# Patient Record
Sex: MALE | Race: WHITE | NOT HISPANIC OR LATINO | Employment: PART TIME | URBAN - NONMETROPOLITAN AREA
[De-identification: names, ages, dates, MRNs, and addresses within clinical notes are randomized per-mention and may not be internally consistent; named-entity substitution may affect disease eponyms.]

---

## 2023-12-21 ENCOUNTER — OFFICE VISIT (OUTPATIENT)
Dept: INTERNAL MEDICINE | Facility: CLINIC | Age: 58
End: 2023-12-21
Payer: MEDICARE

## 2023-12-21 VITALS
DIASTOLIC BLOOD PRESSURE: 82 MMHG | TEMPERATURE: 98.3 F | HEIGHT: 68 IN | OXYGEN SATURATION: 96 % | HEART RATE: 59 BPM | SYSTOLIC BLOOD PRESSURE: 128 MMHG

## 2023-12-21 DIAGNOSIS — E78.5 HYPERLIPIDEMIA, UNSPECIFIED HYPERLIPIDEMIA TYPE: ICD-10-CM

## 2023-12-21 DIAGNOSIS — J30.2 SEASONAL ALLERGIES: ICD-10-CM

## 2023-12-21 DIAGNOSIS — N40.1 BPH ASSOCIATED WITH NOCTURIA: ICD-10-CM

## 2023-12-21 DIAGNOSIS — Z12.11 SCREENING FOR COLON CANCER: ICD-10-CM

## 2023-12-21 DIAGNOSIS — I71.40 ABDOMINAL AORTIC ANEURYSM (AAA) WITHOUT RUPTURE, UNSPECIFIED PART: ICD-10-CM

## 2023-12-21 DIAGNOSIS — M62.838 MUSCLE SPASM: ICD-10-CM

## 2023-12-21 DIAGNOSIS — K21.9 GASTROESOPHAGEAL REFLUX DISEASE WITHOUT ESOPHAGITIS: ICD-10-CM

## 2023-12-21 DIAGNOSIS — I10 PRIMARY HYPERTENSION: ICD-10-CM

## 2023-12-21 DIAGNOSIS — Z76.89 ENCOUNTER TO ESTABLISH CARE: Primary | ICD-10-CM

## 2023-12-21 DIAGNOSIS — M54.2 NECK PAIN ON LEFT SIDE: ICD-10-CM

## 2023-12-21 DIAGNOSIS — R35.1 BPH ASSOCIATED WITH NOCTURIA: ICD-10-CM

## 2023-12-21 DIAGNOSIS — I65.22 STENOSIS OF LEFT CAROTID ARTERY: ICD-10-CM

## 2023-12-21 LAB
ALBUMIN SERPL-MCNC: 4.6 G/DL (ref 3.5–5.2)
ALBUMIN/GLOB SERPL: 1.8 G/DL
ALP SERPL-CCNC: 81 U/L (ref 39–117)
ALT SERPL-CCNC: 13 U/L (ref 1–41)
AST SERPL-CCNC: 15 U/L (ref 1–40)
BILIRUB SERPL-MCNC: 0.8 MG/DL (ref 0–1.2)
BUN SERPL-MCNC: 20 MG/DL (ref 8–23)
BUN/CREAT SERPL: 17.4 (ref 7–25)
CALCIUM SERPL-MCNC: 9.5 MG/DL (ref 8.6–10.5)
CHLORIDE SERPL-SCNC: 104 MMOL/L (ref 98–107)
CHOLEST SERPL-MCNC: 184 MG/DL (ref 0–200)
CO2 SERPL-SCNC: 20.6 MMOL/L (ref 22–29)
CREAT SERPL-MCNC: 1.15 MG/DL (ref 0.76–1.27)
EGFRCR SERPLBLD CKD-EPI 2021: 72.9 ML/MIN/1.73
ERYTHROCYTE [DISTWIDTH] IN BLOOD BY AUTOMATED COUNT: 12.4 % (ref 12.3–15.4)
FOLATE SERPL-MCNC: 12.1 NG/ML (ref 4.78–24.2)
GLOBULIN SER CALC-MCNC: 2.6 GM/DL
GLUCOSE SERPL-MCNC: 87 MG/DL (ref 65–99)
HCT VFR BLD AUTO: 51.6 % (ref 37.5–51)
HDLC SERPL-MCNC: 55 MG/DL (ref 40–60)
HGB BLD-MCNC: 17.4 G/DL (ref 13–17.7)
LDLC SERPL CALC-MCNC: 115 MG/DL (ref 0–100)
MAGNESIUM SERPL-MCNC: 2.3 MG/DL (ref 1.6–2.4)
MCH RBC QN AUTO: 30.9 PG (ref 26.6–33)
MCHC RBC AUTO-ENTMCNC: 33.7 G/DL (ref 31.5–35.7)
MCV RBC AUTO: 91.7 FL (ref 79–97)
PLATELET # BLD AUTO: 167 10*3/MM3 (ref 140–450)
POTASSIUM SERPL-SCNC: 4.8 MMOL/L (ref 3.5–5.2)
PROT SERPL-MCNC: 7.2 G/DL (ref 6–8.5)
PSA SERPL-MCNC: 3.01 NG/ML (ref 0–4)
RBC # BLD AUTO: 5.63 10*6/MM3 (ref 4.14–5.8)
SODIUM SERPL-SCNC: 137 MMOL/L (ref 136–145)
TRIGL SERPL-MCNC: 76 MG/DL (ref 0–150)
TSH SERPL DL<=0.005 MIU/L-ACNC: 1.41 UIU/ML (ref 0.27–4.2)
VIT B12 SERPL-MCNC: 426 PG/ML (ref 211–946)
VLDLC SERPL CALC-MCNC: 14 MG/DL (ref 5–40)
WBC # BLD AUTO: 4.02 10*3/MM3 (ref 3.4–10.8)

## 2023-12-21 RX ORDER — LEVOCETIRIZINE DIHYDROCHLORIDE 5 MG/1
TABLET, FILM COATED ORAL
COMMUNITY

## 2023-12-21 RX ORDER — DIPHENOXYLATE HYDROCHLORIDE AND ATROPINE SULFATE 2.5; .025 MG/1; MG/1
TABLET ORAL DAILY
COMMUNITY

## 2023-12-21 RX ORDER — ROSUVASTATIN CALCIUM 20 MG/1
20 TABLET, COATED ORAL DAILY
COMMUNITY

## 2023-12-21 RX ORDER — BACLOFEN 10 MG/1
10 TABLET ORAL 3 TIMES DAILY
Qty: 90 TABLET | Refills: 0 | Status: SHIPPED | OUTPATIENT
Start: 2023-12-21

## 2023-12-21 RX ORDER — CYCLOBENZAPRINE HCL 5 MG
TABLET ORAL
COMMUNITY
Start: 2023-09-19 | End: 2023-12-21

## 2023-12-21 RX ORDER — LISINOPRIL 20 MG/1
20 TABLET ORAL SEE ADMIN INSTRUCTIONS
COMMUNITY

## 2023-12-21 NOTE — PROGRESS NOTES
Date: 2023    Name: Jason Hsu  : 1965    Chief Complaint:   Chief Complaint   Patient presents with    [facility] Care     With Priya today. Would like to discuss possible issue with prostate.     Neck Pain       HPI:  Jason Hsu is a 58 y.o. male presents to [facility] care. He wants to discuss issues with prostate and neck pain. He has hypertension, GERD, seasonal allergies, and hyperlipidemia. He is accompanied by wife.     HTN stable on lisinopril 30 mg daily. He had right carotid artery surgery in  which was 99% blocked and has 40-50% blockage on left. Follows Vascular surgeon in [place] q 6 months. Also has AAA which they are monitoring. He takes a baby aspirin every day along with statin. He denies any angina, dyspnea, SOA, headache, or dizziness.    He reports urinary urgency, nocturia, and polyuria. During the day, he urinates every 5 minutes. His stream is weak. He feels like he is emptying his bladder. He was experiencing these symptoms the last time his PSA was checked in  which was normal.     He has taken Nexium daily for the last 4 years for GERD/heartburn. He reports severe heartburn. He has tried natural remedies and other otc medications with no relief. Tried tapering off the medication but could not stand the heartburn.     He takes Xyzal for his seasonal allergies which are stable. Has hx vertigo occasionally. Does have LOUISE.     He has been having left sided neck pain for the last 3 to 4 weeks. The pain has not alleviated. He has Flexeril and Robaxin at home. He has fractured his neck twice. His neck is constantly stiff. He has a good pillow and a neck massager.    He is up to date with his vision and dental examinations. He is fasting today.  He does not smoke and does not have a history of smoking. Does not drink alcohol or use drugs. PSA utd. He had a colon screening 10 years ago, and it was normal.     History:    Past Medical History:   Diagnosis Date    Arthritis  "2016    Erectile dysfunction 2022    Frozen shoulder     bilateral    Hypertension 2018       Past Surgical History:   Procedure Laterality Date    COLONOSCOPY  2018?    KNEE SURGERY      othroscopic    SHOULDER SURGERY      SPINE SURGERY  7/2017       Family History   Problem Relation Age of Onset    Diabetes Mother     Kidney disease Mother     Cancer Sister        Social History     Socioeconomic History    Marital status:    Tobacco Use    Smoking status: Never    Smokeless tobacco: Never   Vaping Use    Vaping Use: Never used   Substance and Sexual Activity    Alcohol use: Not Currently    Drug use: Never    Sexual activity: Not Currently     Partners: Female     Birth control/protection: None       Not on File      Current Outpatient Medications:     aspirin 81 MG oral suspension, , Disp: , Rfl:     esomeprazole (nexIUM) 20 MG capsule, , Disp: , Rfl:     levocetirizine (XYZAL) 5 MG tablet, , Disp: , Rfl:     lisinopril (PRINIVIL,ZESTRIL) 20 MG tablet, Take 1 tablet by mouth See Admin Instructions. Take 1 tab in AM and 1/2 tab in PM, Disp: , Rfl:     Magnesium 125 MG capsule, , Disp: , Rfl:     multivitamin (MULTI VITAMIN PO), Take  by mouth Daily., Disp: , Rfl:     rosuvastatin (CRESTOR) 20 MG tablet, Take 1 tablet by mouth Daily., Disp: , Rfl:     baclofen (LIORESAL) 10 MG tablet, Take 1 tablet by mouth 3 (Three) Times a Day., Disp: 90 tablet, Rfl: 0    ROS:  Documented verbalized content in HPI.    VS:  Vitals:    12/21/23 0919   BP: 128/82   Pulse: 59   Temp: 98.3 °F (36.8 °C)   SpO2: 96%   Height: 172.7 cm (68\")   PainSc: 0-No pain     There is no height or weight on file to calculate BMI.    PE:  Physical Exam  Vitals reviewed.   Constitutional:       General: He is not in acute distress.     Appearance: Normal appearance. He is well-developed.   HENT:      Head: Normocephalic and atraumatic.      Right Ear: Ear canal and external ear normal.      Left Ear: Ear canal and external ear normal.      " Ears:      Comments: Bilateral middle ear effusion noted.     Nose: Nose normal.      Mouth/Throat:      Mouth: Mucous membranes are moist.      Pharynx: Oropharynx is clear.   Eyes:      Conjunctiva/sclera: Conjunctivae normal.      Pupils: Pupils are equal, round, and reactive to light.   Neck:      Thyroid: No thyromegaly or thyroid tenderness.      Vascular: No carotid bruit or JVD.      Trachea: Trachea and phonation normal.     Cardiovascular:      Rate and Rhythm: Normal rate and regular rhythm.      Heart sounds: Normal heart sounds. No murmur heard.     No friction rub. No gallop.   Pulmonary:      Effort: Pulmonary effort is normal.      Breath sounds: Normal breath sounds. No wheezing or rhonchi.   Abdominal:      General: Bowel sounds are normal. There is no distension.      Palpations: Abdomen is soft.      Tenderness: There is no abdominal tenderness.   Musculoskeletal:         General: Normal range of motion.      Cervical back: Neck supple. Pain with movement and muscular tenderness present. Decreased range of motion.   Lymphadenopathy:      Cervical: No cervical adenopathy.   Skin:     General: Skin is warm and dry.   Neurological:      Mental Status: He is alert and oriented to person, place, and time.      Cranial Nerves: No cranial nerve deficit.   Psychiatric:         Mood and Affect: Mood and affect normal.         Behavior: Behavior normal.         Thought Content: Thought content normal.         Judgment: Judgment normal.         Assessment/Plan:       Diagnoses and all orders for this visit:    1. Encounter to establish care (Primary)    2. Primary hypertension  -     CBC (No Diff)  -     Comprehensive Metabolic Panel  -     Lipid Panel  -     TSH Rfx On Abnormal To Free T4  -     Vitamin B12  -     Folate  -     Magnesium    3. Hyperlipidemia, unspecified hyperlipidemia type  -     Lipid Panel    4. BPH associated with nocturia  -     PSA Diagnostic    5. Gastroesophageal reflux disease  without esophagitis    6. Seasonal allergies    7. Neck pain on left side    8. Muscle spasm  -     baclofen (LIORESAL) 10 MG tablet; Take 1 tablet by mouth 3 (Three) Times a Day.  Dispense: 90 tablet; Refill: 0  -     CBC (No Diff)  -     Comprehensive Metabolic Panel  -     Vitamin B12  -     Folate  -     Magnesium    9. Screening for colon cancer  -     Ambulatory Referral For Screening Colonoscopy    10. Stenosis of left carotid artery    11. Abdominal aortic aneurysm (AAA) without rupture, unspecified part    1. Hypertension  - Stable. Continue medication as prescribed. DASH diet, exercise 150 minutes a week, home blood pressure monitoring, goal less than 130/80 mmHg.    2. Hyperlipidemia  - Stable with previous lipid panel from 06/2023. Update lipid panel fasting. Low cholesterol diet, exercise regimen.    3. BPH associated with nocturia  - Ordered PSA diagnostic. If normal, continues to be symptomatic, consider treatment with Flomax.    4. GERD  - Stable on Nexium. Reflux measures discussed.    5. Seasonal allergies  - Stable on Xyzal. Initiate fluticasone nasal spray 2 sprays in each nostril daily for middle ear effusion.    6. Neck pain on left side, muscle spasm  - Recommend heat. Demonstrated stretches for patient to do twice a day. Baclofen prescribed to take three times a day as needed. NSAIDs as needed. Consider physical therapy if symptoms persist.    7. Colon screening  - Referral for screening colonoscopy.     8. HCM  - Patient is to obtain labs today fasting. He is up to date with vision and dental. Colon screening ordered. Medicare wellness visit will be scheduled for his next visit. Declines vaccinations today.    9. Carotid artery stenosis, AAA  - Continue follow up Vascular surgery q 6 months as directed for monitoring     Return in about 6 months (around 6/21/2024) for Medicare Wellness.      CAMILO Cuenca    Transcribed from ambient dictation for CAMILO Wetzel by Marylin  Jonh.  12/21/23   13:30 EST    Patient or patient representative verbalized consent to the visit recording.  I have personally performed the services described in this document as transcribed by the above individual, and it is both accurate and complete.

## 2023-12-22 DIAGNOSIS — N40.1 BPH ASSOCIATED WITH NOCTURIA: Primary | ICD-10-CM

## 2023-12-22 DIAGNOSIS — R35.1 BPH ASSOCIATED WITH NOCTURIA: Primary | ICD-10-CM

## 2023-12-22 RX ORDER — TAMSULOSIN HYDROCHLORIDE 0.4 MG/1
1 CAPSULE ORAL DAILY
Qty: 30 CAPSULE | Refills: 2 | Status: SHIPPED | OUTPATIENT
Start: 2023-12-22

## 2023-12-22 NOTE — PROGRESS NOTES
LDL or bad cholesterol 115, work on low cholesterol diet, exercise regimen, continue cholesterol meds nightly   Other labs normal, kidney function is good no concerns there   PSA normal. Will send you in a medication to try and see if helps with your urinary symptoms

## 2024-01-12 ENCOUNTER — OFFICE VISIT (OUTPATIENT)
Dept: INTERNAL MEDICINE | Facility: CLINIC | Age: 59
End: 2024-01-12
Payer: MEDICARE

## 2024-01-12 VITALS
SYSTOLIC BLOOD PRESSURE: 122 MMHG | RESPIRATION RATE: 16 BRPM | OXYGEN SATURATION: 99 % | TEMPERATURE: 97.8 F | HEART RATE: 68 BPM | BODY MASS INDEX: 26.83 KG/M2 | HEIGHT: 68 IN | WEIGHT: 177 LBS | DIASTOLIC BLOOD PRESSURE: 78 MMHG

## 2024-01-12 DIAGNOSIS — I65.22 STENOSIS OF LEFT CAROTID ARTERY: ICD-10-CM

## 2024-01-12 DIAGNOSIS — M54.12 ACUTE CERVICAL RADICULOPATHY: ICD-10-CM

## 2024-01-12 DIAGNOSIS — M54.2 NECK PAIN ON LEFT SIDE: Primary | ICD-10-CM

## 2024-01-12 PROCEDURE — 99213 OFFICE O/P EST LOW 20 MIN: CPT | Performed by: NURSE PRACTITIONER

## 2024-01-12 PROCEDURE — 1159F MED LIST DOCD IN RCRD: CPT | Performed by: NURSE PRACTITIONER

## 2024-01-12 PROCEDURE — 1160F RVW MEDS BY RX/DR IN RCRD: CPT | Performed by: NURSE PRACTITIONER

## 2024-01-12 NOTE — PROGRESS NOTES
Answers submitted by the patient for this visit:  Primary Reason for Visit (Submitted on 2024)  What is the primary reason for your visit?: Other  Other (Submitted on 2024)  Please describe your symptoms.: Sharp ache at top of left shoulder shooting up into my neck.  Have you had these symptoms before?: No  How long have you been having these symptoms?: Greater than 2 weeks  Please list any medications you are currently taking for this condition.: Budafed - does nothing  Please describe any probable cause for these symptoms. : None    Office Visit      Patient Name: Jason Hsu  : 1965   MRN: [id number]   Care Team: Patient Care Team:  Priya Durand APRN as PCP - General (Family Medicine)    Chief Complaint  Shoulder Pain (Left side near neck for about 6-7 weeks getting worse) and Headache (Off and on)    Subjective     Subjective      Jason Hsu presents to [facility] for left-sided neck pain.  Symptoms have been present for at least 7 to 8 weeks.  Saw PCP last month and discussed with her, recommended baclofen and stretching neither of which have helped at all.  He is also having intermittent headaches.  The pain starts at the left base of the neck and radiates up into his head.  He has had rotator cuff repair on this shoulder.  He also suffers from vascular disease specifically carotid artery stenosis, he has known carotid disease of the artery on the left side.  He follows with vascular surgery in [place].  His most recent vascular testing was 8 or 9 months ago per his report and these results are not available for review at this time.  He has not called to discuss this with his vascular provider.  He does not feel like this is a muscular issue as he has tried massage without any improvement.  He is a  and is constantly looking down and has had multiple injuries in the past to the neck.  He has not had no cervical spine imaging recently.  Symptoms are  "worse when turning the head to the left and with flexion.  The symptoms are not reproduced with movement of the shoulder.  He cannot take any NSAIDs.  He is trying Tylenol and diclofenac gel with little relief.  Rates his pain a 4 out of 10 today.      Objective     Objective   Vital Signs:   /78   Pulse 68   Temp 97.8 °F (36.6 °C)   Resp 16   Ht 172.7 cm (67.99\")   Wt 80.3 kg (177 lb)   SpO2 99%   BMI 26.92 kg/m²     Physical Exam  Vitals and nursing note reviewed.   Constitutional:       General: He is not in acute distress.     Appearance: Normal appearance. He is not toxic-appearing.   Eyes:      Pupils: Pupils are equal, round, and reactive to light.   Neck:      Vascular: No carotid bruit (No appreciable bruit).   Cardiovascular:      Rate and Rhythm: Normal rate and regular rhythm.      Heart sounds: Normal heart sounds. No murmur heard.  Pulmonary:      Effort: Pulmonary effort is normal. No respiratory distress.      Breath sounds: Normal breath sounds. No wheezing.   Musculoskeletal:      Right shoulder: Normal.      Left shoulder: Normal.      Cervical back: Neck supple. No signs of trauma, rigidity or tenderness. Pain with movement (To the left with stiffness) present. No muscular tenderness. Decreased range of motion.      Comments: Scarring noted of left shoulder from rotator cuff surgery.   Skin:     General: Skin is warm and dry.      Findings: No rash.   Neurological:      General: No focal deficit present.      Mental Status: He is alert.   Psychiatric:         Mood and Affect: Mood normal.         Behavior: Behavior normal.          Assessment / Plan      Assessment & Plan   Problem List Items Addressed This Visit    None  Visit Diagnoses       Neck pain on left side    -  Primary    Relevant Orders    MRI Cervical Spine Without Contrast    Stenosis of left carotid artery        Acute cervical radiculopathy        Relevant Orders    MRI Cervical Spine Without Contrast    I suspect " this is of musculoskeletal origin.  I recommend MRI to further assess for disc disease.  I recommended PT but they politely declined as they have been doing with a can at home with no improvement.  I have a low suspicion this is a vascular cause but he will call and talk with his vascular surgeon, may need updated CT angiogram.  Recommend stretching, heat, extra strength Tylenol, diclofenac gel.  Further recommendations once MRI results have been reviewed.              Follow Up   Return if symptoms worsen or fail to improve.  Patient was given instructions and counseling regarding his condition or for health maintenance advice. Please see specific information pulled into the AVS if appropriate.     CAMILO Fregoso  [facility]

## 2024-03-06 DIAGNOSIS — N40.1 BPH ASSOCIATED WITH NOCTURIA: ICD-10-CM

## 2024-03-06 DIAGNOSIS — R35.1 BPH ASSOCIATED WITH NOCTURIA: ICD-10-CM

## 2024-03-06 RX ORDER — TAMSULOSIN HYDROCHLORIDE 0.4 MG/1
1 CAPSULE ORAL DAILY
Qty: 30 CAPSULE | Refills: 2 | Status: SHIPPED | OUTPATIENT
Start: 2024-03-06

## 2024-04-02 ENCOUNTER — HOSPITAL ENCOUNTER (OUTPATIENT)
Dept: MRI IMAGING | Facility: HOSPITAL | Age: 59
Discharge: HOME | End: 2024-04-02
Admitting: NURSE PRACTITIONER
Payer: MEDICARE

## 2024-04-02 DIAGNOSIS — M54.2 NECK PAIN ON LEFT SIDE: ICD-10-CM

## 2024-04-02 DIAGNOSIS — M54.12 ACUTE CERVICAL RADICULOPATHY: Primary | ICD-10-CM

## 2024-04-02 DIAGNOSIS — M50.30 DISC DISEASE, DEGENERATIVE, CERVICAL: ICD-10-CM

## 2024-04-02 DIAGNOSIS — M54.12 ACUTE CERVICAL RADICULOPATHY: ICD-10-CM

## 2024-04-02 PROCEDURE — 72141 MRI NECK SPINE W/O DYE: CPT

## 2024-05-14 ENCOUNTER — OFFICE VISIT (OUTPATIENT)
Dept: INTERNAL MEDICINE | Facility: CLINIC | Age: 59
End: 2024-05-14
Payer: MEDICARE

## 2024-05-14 VITALS
TEMPERATURE: 97.8 F | DIASTOLIC BLOOD PRESSURE: 72 MMHG | HEART RATE: 72 BPM | WEIGHT: 176 LBS | OXYGEN SATURATION: 99 % | BODY MASS INDEX: 26.67 KG/M2 | SYSTOLIC BLOOD PRESSURE: 122 MMHG | HEIGHT: 68 IN

## 2024-05-14 DIAGNOSIS — M79.671 PAIN IN BOTH FEET: ICD-10-CM

## 2024-05-14 DIAGNOSIS — R79.9 ABNORMAL FINDING OF BLOOD CHEMISTRY, UNSPECIFIED: ICD-10-CM

## 2024-05-14 DIAGNOSIS — R53.82 CHRONIC FATIGUE: ICD-10-CM

## 2024-05-14 DIAGNOSIS — M79.89 INTERMITTENT PAIN AND SWELLING OF HAND: Primary | ICD-10-CM

## 2024-05-14 DIAGNOSIS — M79.672 PAIN IN BOTH FEET: ICD-10-CM

## 2024-05-14 DIAGNOSIS — M79.643 INTERMITTENT PAIN AND SWELLING OF HAND: Primary | ICD-10-CM

## 2024-05-14 PROCEDURE — 1125F AMNT PAIN NOTED PAIN PRSNT: CPT | Performed by: NURSE PRACTITIONER

## 2024-05-14 PROCEDURE — 1159F MED LIST DOCD IN RCRD: CPT | Performed by: NURSE PRACTITIONER

## 2024-05-14 PROCEDURE — 1160F RVW MEDS BY RX/DR IN RCRD: CPT | Performed by: NURSE PRACTITIONER

## 2024-05-14 PROCEDURE — 99214 OFFICE O/P EST MOD 30 MIN: CPT | Performed by: NURSE PRACTITIONER

## 2024-05-14 NOTE — PROGRESS NOTES
Office Visit      Date: 2024   Patient Name: Jason Hsu  : 1965   MRN: [id number]     Chief Complaint:    Chief Complaint   Patient presents with    Arthritis     Bilateral. Ache pain, heat In knuckles. X1 month it has gotten worse.        History of Present Illness: Jason Hsu is a 58 y.o. male presenting for bilateral hand and foot pain. Wife is present.   The patient reports experiencing soreness, swelling, and tenderness in his fingers, accompanied by redness and warmth intermittently. He also experiences pain in his knuckles and hands, to the extent that he is unable to open them. These symptoms have been present for a year and have progressively worsened. An x-ray of his hand was conducted in the past revealing arthritis. The patient also reports soreness in the balls of his feet, which he attributes to prolonged standing at work. He describes the sensation as walking on bricks and nails. He denies any other joint pain. He wears supportive shoes with insoles. Plans to go to soft shoe after this and get special insoles. The inflammation in in his hands subsided about 3 days ago after reducing his sugar intake.   Wife is concerned for iron deficiency, noting that the patient frequently feels fatigued.   The patient has bleeding under the right thumb, but he does not recall any trauma to the area. He denies any pain, erythema, drainage from nail bed.   His brother had gout. His grandmother had arthritis in hands.    Subjective      Review of Systems:   Pertinent ROS noted in HPI.     I have reviewed the patients family history, social history, past medical history, past surgical history and have updated it as appropriate.     Medications:     Current Outpatient Medications:     aspirin 81 MG oral suspension, , Disp: , Rfl:     esomeprazole (nexIUM) 20 MG capsule, , Disp: , Rfl:     levocetirizine (XYZAL) 5 MG tablet, , Disp: , Rfl:     lisinopril (PRINIVIL,ZESTRIL) 20 MG tablet, Take 1 tablet  by mouth See Admin Instructions. Take 1 tab in AM and 1/2 tab in PM, Disp: , Rfl:     Magnesium 125 MG capsule, , Disp: , Rfl:     multivitamin (MULTI VITAMIN PO), Take  by mouth Daily., Disp: , Rfl:     rosuvastatin (CRESTOR) 20 MG tablet, Take 1 tablet by mouth Daily., Disp: , Rfl:     tamsulosin (FLOMAX) 0.4 MG capsule 24 hr capsule, Take 1 capsule by mouth Daily., Disp: 30 capsule, Rfl: 2    baclofen (LIORESAL) 10 MG tablet, Take 1 tablet by mouth 3 (Three) Times a Day. (Patient not taking: Reported on 1/12/2024), Disp: 90 tablet, Rfl: 0    Allergies:   No Known Allergies    Objective     Physical Exam  Physical Exam  Constitutional:       General: He is not in acute distress.     Appearance: Normal appearance.   HENT:      Head: Normocephalic and atraumatic.      Right Ear: External ear normal.      Left Ear: External ear normal.      Nose: Nose normal.   Eyes:      General: No scleral icterus.     Extraocular Movements: Extraocular movements intact.      Pupils: Pupils are equal, round, and reactive to light.   Cardiovascular:      Rate and Rhythm: Normal rate and regular rhythm.      Heart sounds: Normal heart sounds.   Pulmonary:      Effort: Pulmonary effort is normal.      Breath sounds: Normal breath sounds.   Musculoskeletal:         General: Tenderness and deformity present. No swelling. Normal range of motion.      Right hand: Deformity and bony tenderness present.      Left hand: Deformity and bony tenderness present.      Cervical back: Normal range of motion.      Right lower leg: No edema.      Left lower leg: No edema.      Comments: DIP right middle finger tenderness  Some nodules in DIP joints   No erythema, warmth   Skin:     General: Skin is warm and dry.   Neurological:      General: No focal deficit present.      Mental Status: He is alert and oriented to person, place, and time.   Psychiatric:         Mood and Affect: Mood normal.         Behavior: Behavior normal.         Thought Content:  "Thought content normal.         Judgment: Judgment normal.         Vital Signs:   Vitals:    05/14/24 1353   BP: 122/72   Pulse: 72   Temp: 97.8 °F (36.6 °C)   SpO2: 99%   Weight: 79.8 kg (176 lb)   Height: 172.7 cm (68\")     Body mass index is 26.76 kg/m².    Assessment / Plan      Assessment/Plan:   Problem List Items Addressed This Visit    None  Visit Diagnoses       Intermittent pain and swelling of hand    -  Primary    Relevant Orders    RHEUMATOID FACTOR    Sedimentation rate, automated    C-reactive protein    GINA With / DsDNA, RNP, Sjogrens A / B, Smith    Testosterone (Free & Total), LC / MS    CBC No Differential    Iron and TIBC    Uric acid    Pain in both feet        Relevant Orders    RHEUMATOID FACTOR    Sedimentation rate, automated    C-reactive protein    GINA With / DsDNA, RNP, Sjogrens A / B, Smith    Testosterone (Free & Total), LC / MS    CBC No Differential    Iron and TIBC    Uric acid    Chronic fatigue        Relevant Orders    RHEUMATOID FACTOR    Sedimentation rate, automated    C-reactive protein    GINA With / DsDNA, RNP, Sjogrens A / B, Smith    Testosterone (Free & Total), LC / MS    CBC No Differential    Iron and TIBC    Uric acid            Labs to further evaluate for autoimmune / inflammatory arthritis   Discussed diet; limiting sugar helped  Eat healthy balanced diet  Avoid alcohol or high purine foods in case of gout  NSAIDs sparingly PRN, tylenol arthritis otc PRN  Fatigue can be multifactorial. Ensure adequate sleep 7-9 hours per night, exercise regularly, adequate hydration >64 fluid oz per day; per wife request will check for anemia as well add on testosterone. Testosterone needs to be completed early morning fasting as soon as wake up.     Follow Up:   Return for Medicare Wellness.      Priya PAGE  [facility]  "

## 2024-05-15 LAB
ANA SER QL: NEGATIVE
CRP SERPL-MCNC: <0.3 MG/DL (ref 0–0.5)
ERYTHROCYTE [DISTWIDTH] IN BLOOD BY AUTOMATED COUNT: 12.8 % (ref 12.3–15.4)
ERYTHROCYTE [SEDIMENTATION RATE] IN BLOOD BY WESTERGREN METHOD: 2 MM/HR (ref 0–20)
HCT VFR BLD AUTO: 47.7 % (ref 37.5–51)
HGB BLD-MCNC: 16.2 G/DL (ref 13–17.7)
IRON SATN MFR SERPL: 29 % (ref 20–50)
IRON SERPL-MCNC: 91 MCG/DL (ref 59–158)
MCH RBC QN AUTO: 30.8 PG (ref 26.6–33)
MCHC RBC AUTO-ENTMCNC: 34 G/DL (ref 31.5–35.7)
MCV RBC AUTO: 90.7 FL (ref 79–97)
PLATELET # BLD AUTO: 159 10*3/MM3 (ref 140–450)
RBC # BLD AUTO: 5.26 10*6/MM3 (ref 4.14–5.8)
RHEUMATOID FACT SERPL-ACNC: <10 IU/ML
TIBC SERPL-MCNC: 316 MCG/DL
UIBC SERPL-MCNC: 225 MCG/DL (ref 112–346)
WBC # BLD AUTO: 4.85 10*3/MM3 (ref 3.4–10.8)

## 2024-05-17 NOTE — PROGRESS NOTES
Labs normal so far no signs of inflammation or autoimmune arthritis.   Still waiting on uric acid level to see if gout is a possibility

## 2024-05-18 DIAGNOSIS — R35.1 BPH ASSOCIATED WITH NOCTURIA: ICD-10-CM

## 2024-05-18 DIAGNOSIS — N40.1 BPH ASSOCIATED WITH NOCTURIA: ICD-10-CM

## 2024-05-18 LAB
URATE SERPL-MCNC: 4.5 MG/DL (ref 3.4–7)
WRITTEN AUTHORIZATION: NORMAL

## 2024-05-18 RX ORDER — ROSUVASTATIN CALCIUM 20 MG/1
20 TABLET, COATED ORAL DAILY
Qty: 90 TABLET | Refills: 1 | Status: SHIPPED | OUTPATIENT
Start: 2024-05-18

## 2024-05-18 RX ORDER — LISINOPRIL 20 MG/1
20 TABLET ORAL SEE ADMIN INSTRUCTIONS
Qty: 90 TABLET | Refills: 1 | Status: SHIPPED | OUTPATIENT
Start: 2024-05-18

## 2024-05-18 RX ORDER — TAMSULOSIN HYDROCHLORIDE 0.4 MG/1
1 CAPSULE ORAL DAILY
Qty: 30 CAPSULE | Refills: 2 | Status: SHIPPED | OUTPATIENT
Start: 2024-05-18

## 2024-05-20 NOTE — PROGRESS NOTES
Uric acid levels are normal, no signs of gout.  May be due to osteoarthritis. can send you to a rheumatologist who can do a further work up; they evaluate and treat different forms of arthritis.  You can take NSAIDs as needed such as over-the-counter ibuprofen, naproxen.  Just know that daily use of these medications over time especially high doses can cause gastric ulcers, bleeding, and decreased kidney function.  They are ok to take sparingly as needed on days that you have a really bad flare.  You can also try Tylenol arthritis, voltaren gel is an over-the-counter anti-inflammatory arthritis gel and capsaicin is also an over-the-counter topical treatment for arthritis

## 2024-07-17 ENCOUNTER — PATIENT MESSAGE (OUTPATIENT)
Dept: INTERNAL MEDICINE | Facility: CLINIC | Age: 59
End: 2024-07-17
Payer: MEDICARE

## 2024-07-17 NOTE — TELEPHONE ENCOUNTER
From: Jason Hsu  To: Priya Durand  Sent: 7/17/2024 9:39 AM EDT  Subject: Podiatrist     Good morning Priya,  I think I need to see a podiatrist as I am having increased pain at the back of and just above my left heel. Hurts worse after sitting and when I first get up and move around. My wife saw someone in [place] (Linda Zuñiga), but whoever you recommend is fine. Let me know if there are any issues, thanks.  Jason

## 2024-07-18 DIAGNOSIS — M79.671 PAIN IN BOTH FEET: Primary | ICD-10-CM

## 2024-07-18 DIAGNOSIS — M79.672 PAIN IN BOTH FEET: Primary | ICD-10-CM

## 2024-08-19 ENCOUNTER — OFFICE VISIT (OUTPATIENT)
Dept: INTERNAL MEDICINE | Facility: CLINIC | Age: 59
End: 2024-08-19
Payer: MEDICARE

## 2024-08-19 VITALS
TEMPERATURE: 98.6 F | BODY MASS INDEX: 27.43 KG/M2 | OXYGEN SATURATION: 96 % | HEIGHT: 68 IN | WEIGHT: 181 LBS | SYSTOLIC BLOOD PRESSURE: 118 MMHG | DIASTOLIC BLOOD PRESSURE: 64 MMHG | HEART RATE: 68 BPM

## 2024-08-19 DIAGNOSIS — R10.13 EPIGASTRIC PAIN: ICD-10-CM

## 2024-08-19 DIAGNOSIS — Z12.11 SCREENING FOR COLON CANCER: ICD-10-CM

## 2024-08-19 DIAGNOSIS — R19.5 ABNORMAL STOOL COLOR: Primary | ICD-10-CM

## 2024-08-19 PROCEDURE — 99214 OFFICE O/P EST MOD 30 MIN: CPT | Performed by: NURSE PRACTITIONER

## 2024-08-19 PROCEDURE — 1125F AMNT PAIN NOTED PAIN PRSNT: CPT | Performed by: NURSE PRACTITIONER

## 2024-08-19 NOTE — PROGRESS NOTES
Office Visit      Date: 2024   Patient Name: Jason Hsu  : 1965   MRN: [id number]     Chief Complaint:    Chief Complaint   Patient presents with    Stool Color Change     Yellowish X 1 week       History of Present Illness: Jason Hsu is a 59 y.o. male presents with wife for stool color change.  About a week ago stool was pale/white in color.  This went on for a few days, then it transitioned to a yellow color.  Now it is brown.  He has not had any diarrhea or constipation.  Has GERD and takes Nexium.  No melena, hematochezia, hematemesis.  He occasionally complains of abdominal pain in the epigastric region but is not a daily occurrence.  No bloating, fever, myalgia, chills.  Overdue for colon cancer screening.  Still has gallbladder.  Does not drink alcohol on a regular basis.    Subjective      Review of Systems:   Pertinent ROS noted in HPI.     I have reviewed the patients family history, social history, past medical history, past surgical history and have updated it as appropriate.     Medications:     Current Outpatient Medications:     aspirin 81 MG oral suspension, , Disp: , Rfl:     baclofen (LIORESAL) 10 MG tablet, Take 1 tablet by mouth 3 (Three) Times a Day. (Patient not taking: Reported on 2024), Disp: 90 tablet, Rfl: 0    esomeprazole (nexIUM) 20 MG capsule, , Disp: , Rfl:     levocetirizine (XYZAL) 5 MG tablet, , Disp: , Rfl:     lisinopril (PRINIVIL,ZESTRIL) 20 MG tablet, Take 1 tablet by mouth See Admin Instructions. Take 1 tab in AM and 1/2 tab in PM, Disp: 90 tablet, Rfl: 1    Magnesium 125 MG capsule, , Disp: , Rfl:     multivitamin (MULTI VITAMIN PO), Take  by mouth Daily., Disp: , Rfl:     rosuvastatin (CRESTOR) 20 MG tablet, Take 1 tablet by mouth Daily., Disp: 90 tablet, Rfl: 1    tamsulosin (FLOMAX) 0.4 MG capsule 24 hr capsule, Take 1 capsule by mouth Daily., Disp: 30 capsule, Rfl: 2    Allergies:   No Known Allergies    Objective     Physical Exam  Physical  "Exam  Constitutional:       General: He is not in acute distress.     Appearance: Normal appearance.   HENT:      Head: Normocephalic and atraumatic.   Eyes:      General: No scleral icterus.     Extraocular Movements: Extraocular movements intact.      Pupils: Pupils are equal, round, and reactive to light.   Cardiovascular:      Rate and Rhythm: Normal rate and regular rhythm.   Pulmonary:      Effort: Pulmonary effort is normal.      Breath sounds: Normal breath sounds.   Abdominal:      General: Abdomen is flat. Bowel sounds are normal.      Palpations: Abdomen is soft. There is no hepatomegaly, splenomegaly or mass.      Tenderness: There is no abdominal tenderness.   Musculoskeletal:         General: Normal range of motion.      Cervical back: Normal range of motion.   Skin:     General: Skin is warm and dry.      Coloration: Skin is not jaundiced.   Neurological:      General: No focal deficit present.      Mental Status: He is alert and oriented to person, place, and time. Mental status is at baseline.   Psychiatric:         Mood and Affect: Mood normal.         Behavior: Behavior normal.         Thought Content: Thought content normal.         Judgment: Judgment normal.         Vital Signs:   Vitals:    08/19/24 1310   BP: 118/64   Pulse: 68   Temp: 98.6 °F (37 °C)   SpO2: 96%   Weight: 82.1 kg (181 lb)   Height: 172.7 cm (68\")     Body mass index is 27.52 kg/m².       Assessment / Plan      Assessment/Plan:   Problem List Items Addressed This Visit    None  Visit Diagnoses       Abnormal stool color    -  Primary    Relevant Orders    Comprehensive Metabolic Panel    CBC No Differential    Gastrointestinal Panel, PCR - Stool, Per Rectum    Pancreatic Elastase, Fecal - Stool, Per Rectum    Ambulatory Referral For Screening Colonoscopy    Hepatitis panel, acute    Lipase    Epigastric pain        Relevant Orders    Comprehensive Metabolic Panel    CBC No Differential    Gastrointestinal Panel, PCR - Stool, " Per Rectum    Pancreatic Elastase, Fecal - Stool, Per Rectum    Ambulatory Referral For Screening Colonoscopy    Hepatitis panel, acute    Lipase    Screening for colon cancer        Relevant Orders    Ambulatory Referral For Screening Colonoscopy            Labs, stool tests to be completed today; discussed pale or yellow stool color changes can be r/t to gallbladder, liver, pancreas   Patient does report it has resolved and now more brown in color   After lab review, consider abdominal imaging with ultrasound   Referral general surgeon for consultation for colonoscopy; last colonoscopy was 11 years ago and normal     Follow Up:   RANDALL PAGE  [facility]  [facility]

## 2024-08-20 LAB
ALBUMIN SERPL-MCNC: 4.5 G/DL (ref 3.5–5.2)
ALBUMIN/GLOB SERPL: 1.9 G/DL
ALP SERPL-CCNC: 82 U/L (ref 39–117)
ALT SERPL-CCNC: 14 U/L (ref 1–41)
AST SERPL-CCNC: 14 U/L (ref 1–40)
BILIRUB SERPL-MCNC: 0.6 MG/DL (ref 0–1.2)
BUN SERPL-MCNC: 20 MG/DL (ref 8–23)
BUN/CREAT SERPL: 20.2 (ref 7–25)
CALCIUM SERPL-MCNC: 9.1 MG/DL (ref 8.6–10.5)
CHLORIDE SERPL-SCNC: 104 MMOL/L (ref 98–107)
CO2 SERPL-SCNC: 25.6 MMOL/L (ref 22–29)
CREAT SERPL-MCNC: 0.99 MG/DL (ref 0.76–1.27)
EGFRCR SERPLBLD CKD-EPI 2021: 86.7 ML/MIN/1.73
ERYTHROCYTE [DISTWIDTH] IN BLOOD BY AUTOMATED COUNT: 13.1 % (ref 12.3–15.4)
GLOBULIN SER CALC-MCNC: 2.4 GM/DL
GLUCOSE SERPL-MCNC: 91 MG/DL (ref 65–99)
HCT VFR BLD AUTO: 49.1 % (ref 37.5–51)
HGB BLD-MCNC: 16.5 G/DL (ref 13–17.7)
MCH RBC QN AUTO: 30.8 PG (ref 26.6–33)
MCHC RBC AUTO-ENTMCNC: 33.6 G/DL (ref 31.5–35.7)
MCV RBC AUTO: 91.6 FL (ref 79–97)
PLATELET # BLD AUTO: 154 10*3/MM3 (ref 140–450)
POTASSIUM SERPL-SCNC: 4.4 MMOL/L (ref 3.5–5.2)
PROT SERPL-MCNC: 6.9 G/DL (ref 6–8.5)
RBC # BLD AUTO: 5.36 10*6/MM3 (ref 4.14–5.8)
SODIUM SERPL-SCNC: 138 MMOL/L (ref 136–145)
WBC # BLD AUTO: 5.48 10*3/MM3 (ref 3.4–10.8)

## 2024-08-20 NOTE — PROGRESS NOTES
Patient: Jason Hsu    YOB: 1965    Date: 08/26/2024    Primary Care Provider: Priya Durand APRN    Chief Complaint   Patient presents with    Colonoscopy       SUBJECTIVE:    History of present illness: Patient here with complaint of change in stool color the last 5 days.  They were gray in color but now back to normal.  He was not having any abdominal complaints.  No black or bloody bowel movements.  It has been well over 10 years since his last colonoscopy.  Patient also with GERD which she has had since he was 23 years old.  Nexium controls his symptoms but occasionally has breakthrough symptoms.    The following portions of the patient's history were reviewed and updated as appropriate: allergies, current medications, past family history, past medical history, past social history, past surgical history and problem list.      Review of Systems   Constitutional:  Negative for chills, fever and unexpected weight change.   HENT:  Negative for trouble swallowing and voice change.    Eyes:  Negative for visual disturbance.   Respiratory:  Negative for apnea, cough, chest tightness, shortness of breath and wheezing.    Cardiovascular:  Negative for chest pain, palpitations and leg swelling.   Gastrointestinal:  Positive for abdominal pain. Negative for abdominal distention, anal bleeding, blood in stool, constipation, diarrhea, nausea, rectal pain and vomiting.   Endocrine: Negative for cold intolerance and heat intolerance.   Genitourinary:  Negative for difficulty urinating, dysuria, flank pain, scrotal swelling and testicular pain.   Musculoskeletal:  Negative for back pain, gait problem and joint swelling.   Skin:  Negative for color change, rash and wound.   Neurological:  Negative for dizziness, syncope, speech difficulty, weakness, numbness and headaches.   Hematological:  Negative for adenopathy. Does not bruise/bleed easily.   Psychiatric/Behavioral:  Negative for confusion. The patient is  "not nervous/anxious.          Allergies:  No Known Allergies    Medications:    Current Outpatient Medications:     aspirin 81 MG oral suspension, , Disp: , Rfl:     esomeprazole (nexIUM) 20 MG capsule, , Disp: , Rfl:     levocetirizine (XYZAL) 5 MG tablet, , Disp: , Rfl:     lisinopril (PRINIVIL,ZESTRIL) 20 MG tablet, Take 1 tablet by mouth See Admin Instructions. Take 1 tab in AM and 1/2 tab in PM, Disp: 90 tablet, Rfl: 1    Magnesium 125 MG capsule, , Disp: , Rfl:     multivitamin (MULTI VITAMIN PO), Take  by mouth Daily., Disp: , Rfl:     rosuvastatin (CRESTOR) 20 MG tablet, Take 1 tablet by mouth Daily., Disp: 90 tablet, Rfl: 1    tamsulosin (FLOMAX) 0.4 MG capsule 24 hr capsule, Take 1 capsule by mouth Daily., Disp: 30 capsule, Rfl: 2    History:  Past Medical History:   Diagnosis Date    Arthritis 2016    Benign prostatic hyperplasia 01/22    Erectile dysfunction 2022    Frozen shoulder     bilateral    Hypertension 2018       Past Surgical History:   Procedure Laterality Date    CAROTID STENT Right 2020    COLONOSCOPY  2018?    KNEE SURGERY      othroscopic    SHOULDER SURGERY      SPINE SURGERY  7/2017       Family History   Problem Relation Age of Onset    Diabetes Mother     Kidney disease Mother     Cancer Sister        Social History     Tobacco Use    Smoking status: Never     Passive exposure: Never    Smokeless tobacco: Never   Vaping Use    Vaping status: Never Used   Substance Use Topics    Alcohol use: Not Currently    Drug use: Never        OBJECTIVE:    Vital Signs:   Vitals:    08/26/24 1428   BP: 108/72   Pulse: 93   Resp: 12   Temp: 97.2 °F (36.2 °C)   TempSrc: Temporal   SpO2: 95%   Weight: 81 kg (178 lb 9.6 oz)   Height: 172.7 cm (68\")       Physical Exam:       General Appearance:    Alert, cooperative, in no acute distress   Head:    Normocephalic, without obvious abnormality, atraumatic   Eyes:            Normal.  No scleral icterus.  PERRLA    Lungs:     Clear to " auscultation,respirations regular, even and                  unlabored    Heart:    Regular rhythm and normal rate, normal S1 and S2, no            murmur   Abdomen:     Normal bowel sounds, no masses, no organomegaly, soft        non-tender, non-distended, no guarding,    Extremities:   Moves all extremities well, no edema, no cyanosis, no             redness   Skin:   No bleeding, bruising or rash   Neurologic:   Normal without gross deficits.   Psychiatric: No evidence of depression or anxiety          Results Review:  Labs including CBC and CMP were reviewed    Review of Systems was reviewed and confirmed as accurate as documented by the MA.    ASSESSMENT/PLAN:    1. Screening for colon cancer    2. Gastroesophageal reflux disease without esophagitis        As far as his stool color change this has normalized and I do not recommend any further workup regarding that.  I do however recommend a screening colonoscopy.  Also at the same time since he does have longstanding GERD and occasionally has breakthrough symptoms to proceed with an EGD.  I explained the procedures to the patient as well as the risks of bleeding and perforation and they understand the ramifications of these potential complications and they wish to proceed.  In the meantime continue the Nexium.  Dietary instructions were given as well.          Electronically signed by Gene Ash MD  08/26/24

## 2024-08-21 LAB
HAV IGM SERPL QL IA: NEGATIVE
HBV CORE IGM SERPL QL IA: NEGATIVE
HBV SURFACE AG SERPL QL IA: NEGATIVE
HCV AB SERPL QL IA: NORMAL
HCV IGG SERPL QL IA: NON REACTIVE
LIPASE SERPL-CCNC: 21 U/L (ref 13–60)
WRITTEN AUTHORIZATION: NORMAL

## 2024-08-23 DIAGNOSIS — R19.5 ABNORMAL STOOL COLOR: Primary | ICD-10-CM

## 2024-08-23 DIAGNOSIS — R10.13 EPIGASTRIC PAIN: ICD-10-CM

## 2024-08-23 LAB
ADV 40+41 DNA STL QL NAA+NON-PROBE: NOT DETECTED
ASTRO TYP 1-8 RNA STL QL NAA+NON-PROBE: NOT DETECTED
C CAYETANENSIS DNA STL QL NAA+NON-PROBE: NOT DETECTED
C COLI+JEJ+UPSA DNA STL QL NAA+NON-PROBE: NOT DETECTED
C DIF TOX TCDA+TCDB STL QL NAA+NON-PROBE: NOT DETECTED
CRYPTOSP DNA STL QL NAA+NON-PROBE: NOT DETECTED
E COLI O157 DNA STL QL NAA+NON-PROBE: NORMAL
E HISTOLYT DNA STL QL NAA+NON-PROBE: NOT DETECTED
EAEC PAA PLAS AGGR+AATA ST NAA+NON-PRB: NOT DETECTED
EC STX1+STX2 GENES STL QL NAA+NON-PROBE: NOT DETECTED
EPEC EAE GENE STL QL NAA+NON-PROBE: NOT DETECTED
ETEC LTA+ST1A+ST1B TOX ST NAA+NON-PROBE: NOT DETECTED
G LAMBLIA DNA STL QL NAA+NON-PROBE: NOT DETECTED
NOROVIRUS GI+II RNA STL QL NAA+NON-PROBE: NOT DETECTED
P SHIGELLOIDES DNA STL QL NAA+NON-PROBE: NOT DETECTED
RVA RNA STL QL NAA+NON-PROBE: NOT DETECTED
S ENT+BONG DNA STL QL NAA+NON-PROBE: NOT DETECTED
SAPO I+II+IV+V RNA STL QL NAA+NON-PROBE: NOT DETECTED
SHIGELLA SP+EIEC IPAH ST NAA+NON-PROBE: NOT DETECTED
V CHOL+PARA+VUL DNA STL QL NAA+NON-PROBE: NOT DETECTED
V CHOLERAE DNA STL QL NAA+NON-PROBE: NOT DETECTED
Y ENTEROCOL DNA STL QL NAA+NON-PROBE: NOT DETECTED

## 2024-08-23 NOTE — PROGRESS NOTES
Other labs ok will order abdominal US just to be safe, will be contacted to schedule   Says we are still pending the pancreatic stool test.

## 2024-08-26 ENCOUNTER — OFFICE VISIT (OUTPATIENT)
Dept: SURGERY | Facility: CLINIC | Age: 59
End: 2024-08-26
Payer: MEDICARE

## 2024-08-26 VITALS
BODY MASS INDEX: 27.07 KG/M2 | HEIGHT: 68 IN | WEIGHT: 178.6 LBS | TEMPERATURE: 97.2 F | SYSTOLIC BLOOD PRESSURE: 108 MMHG | OXYGEN SATURATION: 95 % | RESPIRATION RATE: 12 BRPM | HEART RATE: 93 BPM | DIASTOLIC BLOOD PRESSURE: 72 MMHG

## 2024-08-26 DIAGNOSIS — K21.9 GASTROESOPHAGEAL REFLUX DISEASE WITHOUT ESOPHAGITIS: ICD-10-CM

## 2024-08-26 DIAGNOSIS — Z12.11 SCREENING FOR COLON CANCER: Primary | ICD-10-CM

## 2024-08-26 PROCEDURE — 99204 OFFICE O/P NEW MOD 45 MIN: CPT | Performed by: SURGERY

## 2024-08-26 PROCEDURE — 1160F RVW MEDS BY RX/DR IN RCRD: CPT | Performed by: SURGERY

## 2024-08-26 PROCEDURE — 1159F MED LIST DOCD IN RCRD: CPT | Performed by: SURGERY

## 2024-08-26 RX ORDER — POLYETHYLENE GLYCOL 3350 17 G/17G
POWDER, FOR SOLUTION ORAL
Qty: 238 G | Refills: 0 | Status: SHIPPED | OUTPATIENT
Start: 2024-08-26

## 2024-08-26 RX ORDER — BISACODYL 5 MG/1
TABLET, DELAYED RELEASE ORAL
Qty: 4 TABLET | Refills: 0 | Status: SHIPPED | OUTPATIENT
Start: 2024-08-26

## 2024-08-27 ENCOUNTER — PATIENT ROUNDING (BHMG ONLY) (OUTPATIENT)
Dept: SURGERY | Facility: CLINIC | Age: 59
End: 2024-08-27
Payer: MEDICARE

## 2024-08-27 ENCOUNTER — TELEPHONE (OUTPATIENT)
Dept: SURGERY | Facility: CLINIC | Age: 59
End: 2024-08-27
Payer: MEDICARE

## 2024-08-27 NOTE — TELEPHONE ENCOUNTER
PATIENTS WIFE CALLED AND REQUESTED PROCEDURE TO BE RESCHEDULED FOR 11/18/24. VERIFIED WIFE IS ON VERBAL RELEASE. CALL [phone]

## 2024-08-27 NOTE — PROGRESS NOTES
August 27, 2024    Hello, may I speak with Jason Hsu?    My name is Valencia      I am  with [facility]  [address]  [place] [address] [phone].    Before we get started may I verify your date of birth? 1965    I am calling to officially welcome you to our practice and ask about your recent visit. Is this a good time to talk? yes    Tell me about your visit with us. What things went well?  Excellent visit, Was able to reschedule procedure with Dr. Ash.       We're always looking for ways to make our patients' experiences even better. Do you have recommendations on ways we may improve?  no    Overall were you satisfied with your first visit to our practice? yes       I appreciate you taking the time to speak with me today. Is there anything else I can do for you? no      Thank you, and have a great day.

## 2024-10-11 DIAGNOSIS — R35.1 BPH ASSOCIATED WITH NOCTURIA: ICD-10-CM

## 2024-10-11 DIAGNOSIS — N40.1 BPH ASSOCIATED WITH NOCTURIA: ICD-10-CM

## 2024-10-11 RX ORDER — TAMSULOSIN HYDROCHLORIDE 0.4 MG/1
1 CAPSULE ORAL DAILY
Qty: 30 CAPSULE | Refills: 2 | Status: SHIPPED | OUTPATIENT
Start: 2024-10-11

## 2024-11-04 ENCOUNTER — TELEPHONE (OUTPATIENT)
Dept: SURGERY | Facility: CLINIC | Age: 59
End: 2024-11-04

## 2024-11-05 ENCOUNTER — TELEPHONE (OUTPATIENT)
Dept: SURGERY | Facility: CLINIC | Age: 59
End: 2024-11-05
Payer: MEDICARE

## 2024-11-05 NOTE — TELEPHONE ENCOUNTER
PT HAS CANCELLED FOR 11/18/2024, TOOK OFF OR BOOK, [facility] IN [facility] WAS NOTIFIED

## 2024-11-12 ENCOUNTER — OFFICE VISIT (OUTPATIENT)
Dept: INTERNAL MEDICINE | Facility: CLINIC | Age: 59
End: 2024-11-12
Payer: MEDICARE

## 2024-11-12 VITALS
BODY MASS INDEX: 26.98 KG/M2 | WEIGHT: 178 LBS | RESPIRATION RATE: 16 BRPM | HEIGHT: 68 IN | SYSTOLIC BLOOD PRESSURE: 110 MMHG | OXYGEN SATURATION: 98 % | DIASTOLIC BLOOD PRESSURE: 70 MMHG | HEART RATE: 70 BPM | TEMPERATURE: 98 F

## 2024-11-12 DIAGNOSIS — R10.31 RIGHT GROIN PAIN: ICD-10-CM

## 2024-11-12 DIAGNOSIS — E78.5 HYPERLIPIDEMIA, UNSPECIFIED HYPERLIPIDEMIA TYPE: ICD-10-CM

## 2024-11-12 DIAGNOSIS — I10 PRIMARY HYPERTENSION: Primary | ICD-10-CM

## 2024-11-12 PROCEDURE — 99214 OFFICE O/P EST MOD 30 MIN: CPT | Performed by: NURSE PRACTITIONER

## 2024-11-12 PROCEDURE — 1160F RVW MEDS BY RX/DR IN RCRD: CPT | Performed by: NURSE PRACTITIONER

## 2024-11-12 PROCEDURE — 1159F MED LIST DOCD IN RCRD: CPT | Performed by: NURSE PRACTITIONER

## 2024-11-12 PROCEDURE — 1126F AMNT PAIN NOTED NONE PRSNT: CPT | Performed by: NURSE PRACTITIONER

## 2024-11-12 RX ORDER — ROSUVASTATIN CALCIUM 20 MG/1
20 TABLET, COATED ORAL DAILY
Qty: 90 TABLET | Refills: 1 | Status: SHIPPED | OUTPATIENT
Start: 2024-11-12

## 2024-11-12 RX ORDER — LISINOPRIL 20 MG/1
20 TABLET ORAL SEE ADMIN INSTRUCTIONS
Qty: 90 TABLET | Refills: 1 | Status: SHIPPED | OUTPATIENT
Start: 2024-11-12

## 2024-11-12 NOTE — PROGRESS NOTES
Office Visit      Patient Name: Jason Hsu  : 1965   MRN: [id number]     Chief Complaint:    Chief Complaint   Patient presents with    Groin Pain     Right side x 2-3 months        History of Present Illness: Jason Hsu is a 59 y.o. male who is here today with right groin pain that has worsened and been more consistent over the past 2 months.  Hurts when he lifts right leg to put pants on.  Pain onset was sudden.  Felt like he pulled a muscle at first.  He was told when he was born he had a rather large inguinal hernia that resolved without surgical intervention.  He has had intermittent right groin pain for the past couple of years. Does not recall overexertion, heavy lifting, injury prior to onset of pain.  No pain with coughing, straining at this time.    HTN, hyperlipidemia.  Requesting refills of medications.  Taking them regularly, as prescribed.  Denies chest pain, dyspnea, orthopnea, palpitations, lower extremity edema, confusion, headaches, weakness, visual disturbances.  Accompanied by his spouse today.     Subjective      I have reviewed and the following portions of the patient's history were updated as appropriate: past family history, past medical history, past social history, past surgical history and problem list.      Current Outpatient Medications:     aspirin 81 MG oral suspension, , Disp: , Rfl:     esomeprazole (nexIUM) 20 MG capsule, , Disp: , Rfl:     levocetirizine (XYZAL) 5 MG tablet, , Disp: , Rfl:     lisinopril (PRINIVIL,ZESTRIL) 20 MG tablet, Take 1 tablet by mouth See Admin Instructions. Take 1 tab in AM and 1/2 tab in PM, Disp: 90 tablet, Rfl: 1    Magnesium 125 MG capsule, , Disp: , Rfl:     multivitamin (MULTI VITAMIN PO), Take  by mouth Daily., Disp: , Rfl:     rosuvastatin (CRESTOR) 20 MG tablet, Take 1 tablet by mouth Daily., Disp: 90 tablet, Rfl: 1    tamsulosin (FLOMAX) 0.4 MG capsule 24 hr capsule, Take 1 capsule by mouth Daily., Disp: 30 capsule, Rfl: 2    No  "Known Allergies    Objective     Physical Exam:  Vital Signs:   Vitals:    11/12/24 1420   BP: 110/70   Pulse: 70   Resp: 16   Temp: 98 °F (36.7 °C)   TempSrc: Temporal   SpO2: 98%   Weight: 80.7 kg (178 lb)   Height: 172.7 cm (68\")     Body mass index is 27.06 kg/m².         Physical Exam  Exam conducted with a chaperone present (spouse).   Constitutional:       Appearance: He is not ill-appearing.   HENT:      Head: Normocephalic.      Right Ear: External ear normal.      Left Ear: External ear normal.   Eyes:      Conjunctiva/sclera: Conjunctivae normal.      Pupils: Pupils are equal, round, and reactive to light.   Cardiovascular:      Rate and Rhythm: Normal rate and regular rhythm.      Pulses:           Radial pulses are 2+ on the right side and 2+ on the left side.        Dorsalis pedis pulses are 2+ on the right side and 2+ on the left side.      Heart sounds: Normal heart sounds.   Pulmonary:      Effort: Pulmonary effort is normal.      Breath sounds: Normal breath sounds.   Abdominal:      Hernia: There is no hernia in the right inguinal area.   Musculoskeletal:      Cervical back: Normal range of motion and neck supple.   Lymphadenopathy:      Lower Body: No right inguinal adenopathy.   Skin:     General: Skin is warm.      Capillary Refill: Capillary refill takes less than 2 seconds.   Neurological:      Mental Status: He is alert and oriented to person, place, and time.      Coordination: Coordination normal.      Gait: Gait normal.   Psychiatric:         Attention and Perception: Attention normal.         Mood and Affect: Mood and affect normal.         Speech: Speech normal.         Behavior: Behavior normal.             Assessment / Plan      Assessment/Plan:   Diagnoses and all orders for this visit:    1. Primary hypertension (Primary)  -     lisinopril (PRINIVIL,ZESTRIL) 20 MG tablet; Take 1 tablet by mouth See Admin Instructions. Take 1 tab in AM and 1/2 tab in PM  Dispense: 90 tablet; Refill: " 1        - Follow heart healthy diet.  Keep sodium intake < 1500 mg per day.  Avoid processed & fast foods.          - Exercise as tolerated, with a goal of 30 minutes of moderate exercise most days.         - Take medications as prescribed.  2. Hyperlipidemia, unspecified hyperlipidemia type  -     rosuvastatin (CRESTOR) 20 MG tablet; Take 1 tablet by mouth Daily.  Dispense: 90 tablet; Refill: 1    3. Right groin pain  -     US nonvascular extremity limited; Future        - Will refer to General Surgeon if pain worsens.  Advised to go to ED if pain suddenly increases.          Follow Up:   Return if symptoms worsen or fail to improve.    Patient was given instructions and counseling regarding his condition or for health maintenance advice. Please see specific information pulled into the AVS if appropriate.       Primary [facility] [address] [facility]     Please note that portions of this note may have been completed with a voice recognition program. Efforts were made to edit dictation, but occasionally words are mistranscribed.

## 2024-11-20 ENCOUNTER — EXTERNAL PBMM DATA (OUTPATIENT)
Dept: PHARMACY | Facility: OTHER | Age: 59
End: 2024-11-20
Payer: MEDICARE

## 2024-11-21 DIAGNOSIS — E78.5 HYPERLIPIDEMIA, UNSPECIFIED HYPERLIPIDEMIA TYPE: ICD-10-CM

## 2024-11-21 RX ORDER — ROSUVASTATIN CALCIUM 20 MG/1
20 TABLET, COATED ORAL DAILY
Qty: 90 TABLET | Refills: 1 | Status: SHIPPED | OUTPATIENT
Start: 2024-11-21

## 2024-11-23 ENCOUNTER — OFFICE VISIT (OUTPATIENT)
Dept: INTERNAL MEDICINE | Facility: CLINIC | Age: 59
End: 2024-11-23
Payer: MEDICARE

## 2024-11-23 VITALS
BODY MASS INDEX: 28.04 KG/M2 | RESPIRATION RATE: 18 BRPM | HEIGHT: 68 IN | TEMPERATURE: 98.4 F | SYSTOLIC BLOOD PRESSURE: 131 MMHG | OXYGEN SATURATION: 98 % | HEART RATE: 60 BPM | WEIGHT: 185 LBS | DIASTOLIC BLOOD PRESSURE: 84 MMHG

## 2024-11-23 DIAGNOSIS — M25.531 BILATERAL WRIST PAIN: ICD-10-CM

## 2024-11-23 DIAGNOSIS — M24.573 EQUINUS CONTRACTURE OF ANKLE: ICD-10-CM

## 2024-11-23 DIAGNOSIS — M25.532 BILATERAL WRIST PAIN: ICD-10-CM

## 2024-11-23 DIAGNOSIS — M79.672 CHRONIC PAIN IN LEFT FOOT: ICD-10-CM

## 2024-11-23 DIAGNOSIS — R10.31 RIGHT GROIN PAIN: ICD-10-CM

## 2024-11-23 DIAGNOSIS — G89.29 CHRONIC NECK PAIN: Primary | ICD-10-CM

## 2024-11-23 DIAGNOSIS — M54.2 CHRONIC NECK PAIN: Primary | ICD-10-CM

## 2024-11-23 DIAGNOSIS — Z87.81 HISTORY OF CERVICAL FRACTURE: ICD-10-CM

## 2024-11-23 DIAGNOSIS — G56.03 BILATERAL CARPAL TUNNEL SYNDROME: ICD-10-CM

## 2024-11-23 DIAGNOSIS — M76.62 ACHILLES TENDINITIS OF LEFT LOWER EXTREMITY: ICD-10-CM

## 2024-11-23 DIAGNOSIS — G89.29 CHRONIC PAIN IN LEFT FOOT: ICD-10-CM

## 2024-11-23 NOTE — PROGRESS NOTES
Office Visit      Date: 2024   Patient Name: Jason Hsu  : 1965   MRN: [id number]     Chief Complaint:    Chief Complaint   Patient presents with    Wrist Pain     BILATERAL for about one year    Neck Pain     Off and on for a long while    Foot Pain     Left        History of Present Illness: Jason Hsu is a 59 y.o. male presents for bilateral wrist pain, carpal tunnel, left foot pain and chronic neck pain.  Wife is present.  Bilateral wrist pain-received injection into left wrist a year and a half ago and pain was better but both of them are bothering him again.  Posterior wrist pain on lateral sides.  Feels swollen.  No erythema.  No numbness or tingling.  Positive Tinel's.  Negative Phalen's.  Loses strength in his hands, drops things frequently.  History of carpal tunnel.  Ordered wrist braces.  Left foot pain-chronic problem.  Saw podiatry, Dr. Zuñiga, July of this year.  Diagnosed with Achilles tendinitis and equinus contracture of left ankle.  Was provided a boot and wore it for a month but that did not help.  Received an injection but that did not help.  They are interested in a second opinion.  Chronic neck pain-history of 2 cervical fractures and had to wear a halo.  Feels constant pain and tightness on the left side of his neck.  We discussed his neck pain 2023 and he was advised to do exercises, stretches and we treated conservatively with muscle relaxers and NSAIDs.  He has been continuing the exercises as prescribed greater than 6 weeks that he has also saw a chiropractor and has not had relief.  Pending ultrasound to check for hernia due to groin pain x 3 months    Answers submitted by the patient for this visit:  Primary Reason for Visit (Submitted on 2024)  What is the primary reason for your visit?: Extremity Pain  Lower Extremity Injury Questionnaire (Submitted on 2024)  Chief Complaint: Extremity pain  Injury: No  Pain quality: burning, stabbing  Pain -  numeric: 8/10  Progression since onset: comes and goes  difficulty holding things: Yes  upper extremity swelling: Yes  Additional information: Hurts when picking things up, any wrist movements and overall weakness in my hands    Subjective      Review of Systems:   Pertinent ROS noted in HPI.     I have reviewed the patients family history, social history, past medical history, past surgical history and have updated it as appropriate.     Medications:     Current Outpatient Medications:     aspirin 81 MG oral suspension, , Disp: , Rfl:     esomeprazole (nexIUM) 20 MG capsule, , Disp: , Rfl:     levocetirizine (XYZAL) 5 MG tablet, , Disp: , Rfl:     lisinopril (PRINIVIL,ZESTRIL) 20 MG tablet, Take 1 tablet by mouth See Admin Instructions. Take 1 tab in AM and 1/2 tab in PM, Disp: 90 tablet, Rfl: 1    Magnesium 125 MG capsule, , Disp: , Rfl:     multivitamin (MULTI VITAMIN PO), Take  by mouth Daily., Disp: , Rfl:     rosuvastatin (CRESTOR) 20 MG tablet, Take 1 tablet by mouth Daily., Disp: 90 tablet, Rfl: 1    tamsulosin (FLOMAX) 0.4 MG capsule 24 hr capsule, Take 1 capsule by mouth Daily., Disp: 30 capsule, Rfl: 2    Allergies:   No Known Allergies    Objective     Physical Exam  Constitutional:       Appearance: Normal appearance.   HENT:      Head: Normocephalic and atraumatic.   Eyes:      Extraocular Movements: Extraocular movements intact.   Neck:     Cardiovascular:      Rate and Rhythm: Normal rate.   Pulmonary:      Effort: Pulmonary effort is normal.   Musculoskeletal:         General: Normal range of motion.      Cervical back: Normal range of motion. Pain with movement, spinous process tenderness and muscular tenderness present. Decreased range of motion.      Comments: Tinels+ bilaterally  Negative Phalen's bilaterally  Decreased range of motion, tenderness to lateral aspect of posterior wrists.  Decreased strength.  No swelling or erythema.   Neurological:      General: No focal deficit present.       "Mental Status: He is alert and oriented to person, place, and time.   Psychiatric:         Mood and Affect: Mood normal.         Behavior: Behavior normal.         Thought Content: Thought content normal.         Judgment: Judgment normal.         Vital Signs:   Vitals:    11/23/24 1159   BP: 131/84   Pulse: 60   Resp: 18   Temp: 98.4 °F (36.9 °C)   SpO2: 98%   Weight: 83.9 kg (185 lb)   Height: 172.7 cm (67.99\")     Body mass index is 28.14 kg/m².       Assessment / Plan      Assessment/Plan:   Problem List Items Addressed This Visit    None  Visit Diagnoses       Chronic neck pain    -  Primary    Relevant Orders    MRI Cervical Spine Without Contrast    Ambulatory Referral to Orthopedic Surgery (Completed)    History of cervical fracture        Relevant Orders    MRI Cervical Spine Without Contrast    Ambulatory Referral to Orthopedic Surgery (Completed)    Bilateral wrist pain        Relevant Orders    Ambulatory Referral to Orthopedic Surgery (Completed)    Bilateral carpal tunnel syndrome        Relevant Orders    Ambulatory Referral to Orthopedic Surgery (Completed)    Chronic pain in left foot        Relevant Orders    Ambulatory Referral to Orthopedic Surgery (Completed)    Achilles tendinitis of left lower extremity        Relevant Orders    Ambulatory Referral to Orthopedic Surgery (Completed)    Equinus contracture of ankle        Relevant Orders    Ambulatory Referral to Orthopedic Surgery (Completed)    Right groin pain                Patient tried/failed conservative measures for neck pain > 6 weeks  We discussed neck pain in December of last year and he has been doing exercises, stretches and also tried chiropractic therapy.  He was prescribed muscle relaxers and has been using anti-inflammatories without relief.  He has a history of 2 cervical fractures so discussed there is likely degenerative disc disease/arthritis in his cervical spine.  Referral to [facility], Dr. Madison, to " establish care  MRI of cervical spine ordered  Patient has had chronic issues with bilateral wrist pain, carpal tunnel, pain in his left foot.  He saw podiatrist Dr. Zuñiga and was diagnosed with Achilles tendinitis and equinus contracture of ankle.  Provided injections in boot but this was not helpful.  Is seeking a second opinion.  Referral to [facility] to see the podiatrist and other provider regarding wrist pain.  Injections have helped in the past and he has wrist braces ordered.  Recommend to wear those at bedtime and to avoid any repetitive movements and treat conservatively with RICE therapy.  Pending groin ultrasound, will update plan of care after results are reviewed, avoid heavy lifting     Follow Up:   RANDALL PAGE  [facility] - [facility] PATRICIO spent 40 minutes caring for Jason Hsu on this date of service. This time includes time spent by me in the following activities: preparing for the visit, reviewing tests, performing a medically appropriate examination and/or evaluation, counseling and educating the patient/family/caregiver, ordering medications, tests, or procedures and documenting information in the medical record.

## 2024-11-26 ENCOUNTER — POP HEALTH PHARMACY (OUTPATIENT)
Dept: PHARMACY | Facility: OTHER | Age: 59
End: 2024-11-26
Payer: MEDICARE

## 2024-11-26 NOTE — PROGRESS NOTES
Population Health Pharmacy Outreach      Jason Hsu was called today to discuss medication adherence with lisinopril , as he was identified as having care opportunities.    Program Details    No programs to display        Opportunities Identified    lisinopril      Adherence and Medication Management    Adherence Questions   Patient Reported X Missed Doses in the Last 7 Days : 0  Reasons for Non-Adherence : No problems identified  Interested in a 90 day supply? : Already receiving         General Medication Management    Type of medication management: targeted medication review  Review Completed on: 11/26/24  Recipient: other authorized individual  Provider: licensed practical nurse  Method of contact: by telephone           Medication Therapy Problems     Medication Therapy Recommendations  No medication therapy recommendations to display      Summary    Medication Management Summary    Topics discussed: adherence and missed doses discussed  Time spent: 61 - 75 min       Patient plans to pick medication on time. Due for refill on 12/2/2024.  Danica Miller LPN, 11/26/24, 1:41 PM EST.

## 2024-11-27 ENCOUNTER — POP HEALTH PHARMACY (OUTPATIENT)
Dept: PHARMACY | Facility: OTHER | Age: 59
End: 2024-11-27
Payer: MEDICARE

## 2024-11-30 DIAGNOSIS — N40.1 BPH ASSOCIATED WITH NOCTURIA: ICD-10-CM

## 2024-11-30 DIAGNOSIS — R35.1 BPH ASSOCIATED WITH NOCTURIA: ICD-10-CM

## 2024-12-02 RX ORDER — TAMSULOSIN HYDROCHLORIDE 0.4 MG/1
1 CAPSULE ORAL DAILY
Qty: 90 CAPSULE | Refills: 1 | Status: SHIPPED | OUTPATIENT
Start: 2024-12-02

## 2024-12-06 ENCOUNTER — POP HEALTH PHARMACY (OUTPATIENT)
Dept: PHARMACY | Facility: OTHER | Age: 59
End: 2024-12-06
Payer: MEDICARE

## 2024-12-09 ENCOUNTER — APPOINTMENT (OUTPATIENT)
Facility: HOSPITAL | Age: 59
End: 2024-12-09
Payer: MEDICARE

## 2024-12-09 ENCOUNTER — HOSPITAL ENCOUNTER (OUTPATIENT)
Facility: HOSPITAL | Age: 59
Discharge: HOME | End: 2024-12-09
Admitting: NURSE PRACTITIONER
Payer: MEDICARE

## 2024-12-09 DIAGNOSIS — R10.31 RIGHT GROIN PAIN: ICD-10-CM

## 2024-12-09 PROCEDURE — 76882 US LMTD JT/FCL EVL NVASC XTR: CPT

## 2024-12-10 ENCOUNTER — TRANSCRIBE ORDERS (OUTPATIENT)
Dept: ADMINISTRATIVE | Facility: HOSPITAL | Age: 59
End: 2024-12-10
Payer: MEDICARE

## 2024-12-10 DIAGNOSIS — Z51.81 ENCOUNTER FOR MONITORING ANTIPLATELET THERAPY: Primary | ICD-10-CM

## 2024-12-10 DIAGNOSIS — I71.43 INFRARENAL ABDOMINAL AORTIC ANEURYSM (AAA) WITHOUT RUPTURE: ICD-10-CM

## 2024-12-10 DIAGNOSIS — Z79.02 ENCOUNTER FOR MONITORING ANTIPLATELET THERAPY: Primary | ICD-10-CM

## 2024-12-10 DIAGNOSIS — E78.00 HYPERCHOLESTEROLEMIA: ICD-10-CM

## 2024-12-12 ENCOUNTER — PATIENT MESSAGE (OUTPATIENT)
Dept: INTERNAL MEDICINE | Facility: CLINIC | Age: 59
End: 2024-12-12
Payer: MEDICARE

## 2024-12-12 DIAGNOSIS — R35.1 BPH ASSOCIATED WITH NOCTURIA: ICD-10-CM

## 2024-12-12 DIAGNOSIS — N40.1 BPH ASSOCIATED WITH NOCTURIA: ICD-10-CM

## 2024-12-12 RX ORDER — TAMSULOSIN HYDROCHLORIDE 0.4 MG/1
1 CAPSULE ORAL DAILY
Qty: 90 CAPSULE | Refills: 1 | Status: SHIPPED | OUTPATIENT
Start: 2024-12-12

## 2024-12-13 NOTE — PROGRESS NOTES
Small fat containing right inguinal hernia.  Typically these do not require surgical intervention but if continuing to cause pain, can refer to general surgery.    Incidental finding of mildly reactive lymph node in right groin.  No further work up is required but if desired can order a CT scan for further evaluation.

## 2025-01-03 ENCOUNTER — PATIENT MESSAGE (OUTPATIENT)
Dept: INTERNAL MEDICINE | Facility: CLINIC | Age: 60
End: 2025-01-03
Payer: MEDICARE

## 2025-01-03 DIAGNOSIS — R10.31 RIGHT GROIN PAIN: Primary | ICD-10-CM

## 2025-01-03 DIAGNOSIS — K40.90 RIGHT INGUINAL HERNIA: ICD-10-CM

## 2025-01-09 NOTE — PROGRESS NOTES
Patient: Jason Hsu    YOB: 1965    Date: 01/14/2025    Primary Care Provider: Priya Durand APRN    Chief Complaint   Patient presents with    Groin Pain       SUBJECTIVE:    History of present illness: Patient with a 6-month history of right groin pain.  He states it is mostly when he flexes his thigh or bends over.  No nausea or vomiting.    The following portions of the patient's history were reviewed and updated as appropriate: allergies, current medications, past family history, past medical history, past social history, past surgical history and problem list.      Review of Systems   Constitutional:  Negative for chills, fever and unexpected weight change.   HENT:  Negative for trouble swallowing and voice change.    Eyes:  Negative for visual disturbance.   Respiratory:  Negative for apnea, cough, chest tightness, shortness of breath and wheezing.    Cardiovascular:  Negative for chest pain, palpitations and leg swelling.   Gastrointestinal:  Negative for abdominal distention, abdominal pain, anal bleeding, blood in stool, constipation, diarrhea, nausea, rectal pain and vomiting.   Endocrine: Negative for cold intolerance and heat intolerance.   Genitourinary:  Negative for difficulty urinating, dysuria, flank pain, scrotal swelling and testicular pain.   Musculoskeletal:  Negative for back pain, gait problem and joint swelling.   Skin:  Negative for color change, rash and wound.   Neurological:  Negative for dizziness, syncope, speech difficulty, weakness, numbness and headaches.   Hematological:  Negative for adenopathy. Does not bruise/bleed easily.   Psychiatric/Behavioral:  Negative for confusion. The patient is not nervous/anxious.          Allergies:  No Known Allergies    Medications:    Current Outpatient Medications:     aspirin 81 MG oral suspension, , Disp: , Rfl:     esomeprazole (nexIUM) 20 MG capsule, , Disp: , Rfl:     levocetirizine (XYZAL) 5 MG tablet, , Disp: , Rfl:     " lisinopril (PRINIVIL,ZESTRIL) 20 MG tablet, Take 1 tablet by mouth See Admin Instructions. Take 1 tab in AM and 1/2 tab in PM, Disp: 90 tablet, Rfl: 1    Magnesium 125 MG capsule, , Disp: , Rfl:     multivitamin (MULTI VITAMIN PO), Take  by mouth Daily., Disp: , Rfl:     predniSONE (DELTASONE) 20 MG tablet, Take THREE tablets BY MOUTH FOR 7 DAYS, THEN take TWO tabs FOR 7 DAYS, THEN ONE tab FOR 7 DAYS AND finish with ONE-HALF tab FOR 7 DAYS, Disp: , Rfl:     rosuvastatin (CRESTOR) 20 MG tablet, Take 1 tablet by mouth Daily., Disp: 90 tablet, Rfl: 1    tamsulosin (FLOMAX) 0.4 MG capsule 24 hr capsule, Take 1 capsule by mouth Daily., Disp: 90 capsule, Rfl: 1    History:  Past Medical History:   Diagnosis Date    Arthritis 2016    Benign prostatic hyperplasia 01/22    Erectile dysfunction 2022    Frozen shoulder     bilateral    Hypertension 2018       Past Surgical History:   Procedure Laterality Date    CAROTID STENT Right 2020    COLONOSCOPY  2018?    FRACTURE SURGERY  1989?    Left shoulder    KNEE SURGERY      othroscopic    SHOULDER SURGERY      SPINE SURGERY  7/2017       Family History   Problem Relation Age of Onset    Diabetes Mother     Kidney disease Mother     Cancer Sister        Social History     Tobacco Use    Smoking status: Never     Passive exposure: Never    Smokeless tobacco: Never   Vaping Use    Vaping status: Never Used   Substance Use Topics    Alcohol use: Not Currently    Drug use: Never        OBJECTIVE:    Vital Signs:   Vitals:    01/14/25 0939   BP: 138/90   Pulse: 71   Temp: 98.2 °F (36.8 °C)   SpO2: 98%   Weight: 88.5 kg (195 lb)   Height: 172.7 cm (67.99\")       Physical Exam:       General Appearance:    Alert, cooperative, in no acute distress   Head:    Normocephalic, without obvious abnormality, atraumatic   Eyes:            Normal.  No scleral icterus.  PERRLA    Lungs:     Clear to auscultation,respirations regular, even and                  unlabored    Heart:    Regular rhythm " and normal rate, normal S1 and S2, no            murmur   Abdomen:   Soft nontender nondistended.  Questionable small right inguinal hernia.   Extremities:   Moves all extremities well, no edema, no cyanosis, no             redness   Skin:   No bleeding, bruising or rash   Neurologic:   Normal without gross deficits.   Psychiatric: No evidence of depression or anxiety          Results Review:   I reviewed the patient's new clinical results.  Informal ultrasound today also reveals a right inguinal hernia    Review of Systems was reviewed and confirmed as accurate as documented by the MA.    ASSESSMENT/PLAN:    1. Non-recurrent unilateral inguinal hernia without obstruction or gangrene        Ultrasound today also reveals a right inguinal hernia.  I explained to him that this hernia may be causing his pain but repair of the hernia may not resolve his pain as the pain may be musculoskeletal in nature.  I explained to him what a hernia is and the risks of the hernia including intestinal incarceration/strangulation although there is no intestinal content within the hernia currently.  We recommend repair of the hernia as hernias tend to become worse with time.  He understands the procedure as well as the use of mesh for repair.  He also understands the risks of bleeding, infection, recurrence, pain including long-term pain, numbness, mesh issues including pain and infection etc.  Has abdominal aortic aneurysm which will be further evaluated here in the near future and as long as they do not need to intervene he will likely call us to schedule this.  He will call us when he is ready.          Electronically signed by Gene Ash MD  01/14/25

## 2025-01-14 ENCOUNTER — OFFICE VISIT (OUTPATIENT)
Dept: SURGERY | Facility: CLINIC | Age: 60
End: 2025-01-14
Payer: MEDICARE

## 2025-01-14 VITALS
OXYGEN SATURATION: 98 % | BODY MASS INDEX: 29.55 KG/M2 | DIASTOLIC BLOOD PRESSURE: 90 MMHG | HEART RATE: 71 BPM | HEIGHT: 68 IN | SYSTOLIC BLOOD PRESSURE: 138 MMHG | WEIGHT: 195 LBS | TEMPERATURE: 98.2 F

## 2025-01-14 DIAGNOSIS — K40.90 NON-RECURRENT UNILATERAL INGUINAL HERNIA WITHOUT OBSTRUCTION OR GANGRENE: Primary | ICD-10-CM

## 2025-01-14 PROCEDURE — 99214 OFFICE O/P EST MOD 30 MIN: CPT | Performed by: SURGERY

## 2025-01-14 PROCEDURE — 1160F RVW MEDS BY RX/DR IN RCRD: CPT | Performed by: SURGERY

## 2025-01-14 PROCEDURE — 1159F MED LIST DOCD IN RCRD: CPT | Performed by: SURGERY

## 2025-01-14 RX ORDER — PREDNISONE 20 MG/1
TABLET ORAL
COMMUNITY
Start: 2025-01-08

## 2025-01-17 ENCOUNTER — HOSPITAL ENCOUNTER (OUTPATIENT)
Dept: CT IMAGING | Facility: HOSPITAL | Age: 60
Discharge: HOME | End: 2025-01-17
Payer: MEDICARE

## 2025-01-17 ENCOUNTER — TELEPHONE (OUTPATIENT)
Dept: SURGERY | Facility: CLINIC | Age: 60
End: 2025-01-17

## 2025-01-17 DIAGNOSIS — Z51.81 ENCOUNTER FOR MONITORING ANTIPLATELET THERAPY: ICD-10-CM

## 2025-01-17 DIAGNOSIS — E78.00 HYPERCHOLESTEROLEMIA: ICD-10-CM

## 2025-01-17 DIAGNOSIS — Z79.02 ENCOUNTER FOR MONITORING ANTIPLATELET THERAPY: ICD-10-CM

## 2025-01-17 DIAGNOSIS — I71.43 INFRARENAL ABDOMINAL AORTIC ANEURYSM (AAA) WITHOUT RUPTURE: ICD-10-CM

## 2025-01-17 PROCEDURE — 74174 CTA ABD&PLVS W/CONTRAST: CPT

## 2025-01-17 PROCEDURE — 25510000001 IOPAMIDOL 61 % SOLUTION: Performed by: PHYSICIAN ASSISTANT

## 2025-01-17 RX ORDER — IOPAMIDOL 612 MG/ML
100 INJECTION, SOLUTION INTRAVASCULAR
Status: COMPLETED | OUTPATIENT
Start: 2025-01-17 | End: 2025-01-17

## 2025-01-17 RX ADMIN — IOPAMIDOL 100 ML: 612 INJECTION, SOLUTION INTRAVENOUS at 13:11

## 2025-01-17 NOTE — TELEPHONE ENCOUNTER
Hub staff attempted to follow warm transfer process and was unsuccessful     Caller: BRAULIO MCKEON    Relationship to patient: Emergency Contact    Best call back number: [phone]    Patient is needing: PATIENT NEEDS TO HAVE UPPER SCOPE/COLONOSCOPY DONE ASAP TO RULE OUT OR DETERMINE IF AN ULCER IS PRESENT.  HE NEEDS TO HAVE THIS DONE BEFORE BEING ABLE TO PROCEED FORWARD WITH ABDOMINAL ANEURYSM SURGERY. PER DR WEBSTER, PROCEDURE CAN BE SCHEDULED SINCE PT WAS SEEN 1/14/25.  PLEASE ADVISE

## 2025-01-22 ENCOUNTER — PREP FOR SURGERY (OUTPATIENT)
Dept: OTHER | Facility: HOSPITAL | Age: 60
End: 2025-01-22
Payer: MEDICARE

## 2025-01-22 DIAGNOSIS — Z12.11 SCREENING FOR COLON CANCER: ICD-10-CM

## 2025-01-22 DIAGNOSIS — K21.9 GASTROESOPHAGEAL REFLUX DISEASE WITHOUT ESOPHAGITIS: Primary | ICD-10-CM

## 2025-01-23 DIAGNOSIS — E78.5 HYPERLIPIDEMIA, UNSPECIFIED HYPERLIPIDEMIA TYPE: ICD-10-CM

## 2025-01-24 RX ORDER — ROSUVASTATIN CALCIUM 20 MG/1
20 TABLET, COATED ORAL DAILY
Qty: 90 TABLET | Refills: 0 | Status: SHIPPED | OUTPATIENT
Start: 2025-01-24

## 2025-01-27 ENCOUNTER — OUTSIDE FACILITY SERVICE (OUTPATIENT)
Dept: SURGERY | Facility: CLINIC | Age: 60
End: 2025-01-27
Payer: MEDICARE

## 2025-02-07 ENCOUNTER — OFFICE VISIT (OUTPATIENT)
Dept: INTERNAL MEDICINE | Facility: CLINIC | Age: 60
End: 2025-02-07
Payer: MEDICARE

## 2025-02-07 ENCOUNTER — PATIENT MESSAGE (OUTPATIENT)
Dept: INTERNAL MEDICINE | Facility: CLINIC | Age: 60
End: 2025-02-07

## 2025-02-07 VITALS
SYSTOLIC BLOOD PRESSURE: 124 MMHG | OXYGEN SATURATION: 98 % | TEMPERATURE: 98.9 F | DIASTOLIC BLOOD PRESSURE: 80 MMHG | HEART RATE: 96 BPM | HEIGHT: 68 IN | WEIGHT: 193 LBS | BODY MASS INDEX: 29.25 KG/M2

## 2025-02-07 DIAGNOSIS — E55.9 VITAMIN D DEFICIENCY: ICD-10-CM

## 2025-02-07 DIAGNOSIS — R35.1 BPH ASSOCIATED WITH NOCTURIA: ICD-10-CM

## 2025-02-07 DIAGNOSIS — R53.83 FATIGUE, UNSPECIFIED TYPE: ICD-10-CM

## 2025-02-07 DIAGNOSIS — I10 PRIMARY HYPERTENSION: ICD-10-CM

## 2025-02-07 DIAGNOSIS — Z13.29 SCREENING FOR ENDOCRINE, METABOLIC AND IMMUNITY DISORDER: ICD-10-CM

## 2025-02-07 DIAGNOSIS — N40.1 BPH ASSOCIATED WITH NOCTURIA: ICD-10-CM

## 2025-02-07 DIAGNOSIS — Z12.5 SCREENING PSA (PROSTATE SPECIFIC ANTIGEN): ICD-10-CM

## 2025-02-07 DIAGNOSIS — E78.5 HYPERLIPIDEMIA, UNSPECIFIED HYPERLIPIDEMIA TYPE: ICD-10-CM

## 2025-02-07 DIAGNOSIS — K21.9 GASTROESOPHAGEAL REFLUX DISEASE WITHOUT ESOPHAGITIS: ICD-10-CM

## 2025-02-07 DIAGNOSIS — Z00.00 MEDICARE ANNUAL WELLNESS VISIT, SUBSEQUENT: ICD-10-CM

## 2025-02-07 DIAGNOSIS — Z00.00 ANNUAL PHYSICAL EXAM: Primary | ICD-10-CM

## 2025-02-07 DIAGNOSIS — I71.40 ABDOMINAL AORTIC ANEURYSM (AAA) WITHOUT RUPTURE, UNSPECIFIED PART: ICD-10-CM

## 2025-02-07 DIAGNOSIS — Z13.228 SCREENING FOR ENDOCRINE, METABOLIC AND IMMUNITY DISORDER: ICD-10-CM

## 2025-02-07 DIAGNOSIS — R73.9 HYPERGLYCEMIA: ICD-10-CM

## 2025-02-07 DIAGNOSIS — Z13.0 SCREENING FOR ENDOCRINE, METABOLIC AND IMMUNITY DISORDER: ICD-10-CM

## 2025-02-07 PROBLEM — K40.90 RIGHT INGUINAL HERNIA: Status: ACTIVE | Noted: 2025-02-07

## 2025-02-07 PROCEDURE — 1170F FXNL STATUS ASSESSED: CPT | Performed by: NURSE PRACTITIONER

## 2025-02-07 PROCEDURE — 99396 PREV VISIT EST AGE 40-64: CPT | Performed by: NURSE PRACTITIONER

## 2025-02-07 PROCEDURE — 1126F AMNT PAIN NOTED NONE PRSNT: CPT | Performed by: NURSE PRACTITIONER

## 2025-02-07 PROCEDURE — 3079F DIAST BP 80-89 MM HG: CPT | Performed by: NURSE PRACTITIONER

## 2025-02-07 PROCEDURE — 99213 OFFICE O/P EST LOW 20 MIN: CPT | Performed by: NURSE PRACTITIONER

## 2025-02-07 PROCEDURE — 3074F SYST BP LT 130 MM HG: CPT | Performed by: NURSE PRACTITIONER

## 2025-02-07 PROCEDURE — G0439 PPPS, SUBSEQ VISIT: HCPCS | Performed by: NURSE PRACTITIONER

## 2025-02-07 RX ORDER — ROSUVASTATIN CALCIUM 20 MG/1
20 TABLET, COATED ORAL DAILY
Qty: 90 TABLET | Refills: 1 | Status: SHIPPED | OUTPATIENT
Start: 2025-02-07

## 2025-02-07 RX ORDER — LISINOPRIL 20 MG/1
TABLET ORAL
Qty: 135 TABLET | Refills: 1 | Status: SHIPPED | OUTPATIENT
Start: 2025-02-07

## 2025-02-07 NOTE — PROGRESS NOTES
Subjective     Medicare Wellness Visit      Jason Hsu is a 59 y.o. patient who presents for a Medicare Wellness Visit.  Hypertension-stable on lisinopril 20 mg daily.  He did have an episode where his blood pressure dropped to 80s over 60s a few days ago but he has been pushing more water and this improved.  Happen when he stood up in the evening.  Denies any chest pain, shortness of air, visual changes, headaches, lower extremity edema, syncope.  Hyperlipidemia-takes Crestor 20 mg at bedtime  BPH-takes Flomax 0.4 mg daily, not established with urology.  He has been having more urinary frequency and urgency but states he has been drinking a lot more water and he thinks this is the cause.  Due a PSA.  Allergies stable on Xyzal  Takes Nexium as needed for GERD  He has an upcoming AAA repair scheduled  Seeing orthopedics for neck pain, wrist pain which they diagnosed de quervain's, and foot pain.  They did injections into his neck which has provided to be beneficial.  They are monitoring his wrist pain for now but discussed potential surgery in the future to release the tendon if continues to have issues.  They performed an MRI of his foot and get the results of that on Wednesday.  He is going to [facility].  Vaccines and screenings reviewed    The following portions of the patient's history were reviewed and   updated as appropriate: allergies, current medications, past family history, past medical history, past social history, past surgical history, and problem list.    Compared to one year ago, the patient's physical   health is worse.  Compared to one year ago, the patient's mental   health is the same.    Recent Hospitalizations:  He was not admitted to the hospital during the last year.     Current Medical Providers:  Patient Care Team:  Priya Durand APRN as PCP - General (Family Medicine)    Outpatient Medications Prior to Visit   Medication Sig Dispense Refill    aspirin 81 MG oral  "suspension       esomeprazole (nexIUM) 20 MG capsule       levocetirizine (XYZAL) 5 MG tablet       Magnesium 125 MG capsule       multivitamin (MULTI VITAMIN PO) Take  by mouth Daily.      tamsulosin (FLOMAX) 0.4 MG capsule 24 hr capsule Take 1 capsule by mouth Daily. 90 capsule 1    lisinopril (PRINIVIL,ZESTRIL) 20 MG tablet Take 1 tablet by mouth See Admin Instructions. Take 1 tab in AM and 1/2 tab in PM 90 tablet 1    rosuvastatin (CRESTOR) 20 MG tablet Take 1 tablet by mouth Daily. 90 tablet 0    predniSONE (DELTASONE) 20 MG tablet Take THREE tablets BY MOUTH FOR 7 DAYS, THEN take TWO tabs FOR 7 DAYS, THEN ONE tab FOR 7 DAYS AND finish with ONE-HALF tab FOR 7 DAYS       No facility-administered medications prior to visit.     No opioid medication identified on active medication list. I have reviewed chart for other potential  high risk medication/s and harmful drug interactions in the elderly.      Aspirin is on active medication list. Aspirin use is indicated based on review of current medical condition/s. Pros and cons of this therapy have been discussed today. Benefits of this medication outweigh potential harm.  Patient has been encouraged to continue taking this medication.  .      Patient Active Problem List   Diagnosis    Primary hypertension    Hyperlipidemia    BPH associated with nocturia    Right inguinal hernia    Abdominal aortic aneurysm (AAA) without rupture    Gastroesophageal reflux disease without esophagitis     Advance Care Planning Advance Directive is not on file.      Objective   Vitals:    02/07/25 1446   BP: 124/80   Pulse: 96   Temp: 98.9 °F (37.2 °C)   SpO2: 98%   Weight: 87.5 kg (193 lb)   Height: 172.7 cm (67.99\")   PainSc: 0-No pain       Estimated body mass index is 29.35 kg/m² as calculated from the following:    Height as of this encounter: 172.7 cm (67.99\").    Weight as of this encounter: 87.5 kg (193 lb).         Does the patient have evidence of cognitive impairment? No     "                                                                                       Health  Risk Assessment    Smoking Status:  Social History     Tobacco Use   Smoking Status Never    Passive exposure: Never   Smokeless Tobacco Never     Alcohol Consumption:  Social History     Substance and Sexual Activity   Alcohol Use Not Currently       Fall Risk Screen  STEADI Fall Risk Assessment was completed, and patient is at LOW risk for falls.Assessment completed on:2025    Depression Screening  Little interest or pleasure in doing things? Not at all   Feeling down, depressed, or hopeless? Not at all   PHQ-2 Total Score 0      Health Habits and Functional and Cognitive Screenin/7/2025     2:49 PM   Functional & Cognitive Status   Do you have difficulty preparing food and eating? No   Do you have difficulty bathing yourself, getting dressed or grooming yourself? No   Do you have difficulty using the toilet? No   Do you have difficulty moving around from place to place? No   Do you have trouble with steps or getting out of a bed or a chair? No   Current Diet Well Balanced Diet   Dental Exam Up to date   Eye Exam Up to date   Exercise (times per week) 0 times per week   Current Exercises Include No Regular Exercise   Do you need help using the phone?  No   Are you deaf or do you have serious difficulty hearing?  No   Do you need help to go to places out of walking distance? No   Do you need help shopping? No   Do you need help preparing meals?  No   Do you need help with housework?  No   Do you need help with laundry? No   Do you need help taking your medications? No   Do you need help managing money? No   Do you ever drive or ride in a car without wearing a seat belt? No   Have you felt unusual stress, anger or loneliness in the last month? No   Who do you live with? Spouse   If you need help, do you have trouble finding someone available to you? No   Have you been bothered in the last four weeks by sexual  problems? No   Do you have difficulty concentrating, remembering or making decisions? No           Age-appropriate Screening Schedule:  Refer to the list below for future screening recommendations based on patient's age, sex and/or medical conditions. Orders for these recommended tests are listed in the plan section. The patient has been provided with a written plan.    Health Maintenance List  Health Maintenance   Topic Date Due    ANNUAL WELLNESS VISIT  Never done    LIPID PANEL  12/21/2024    INFLUENZA VACCINE  03/31/2025 (Originally 7/1/2024)    COVID-19 Vaccine (1 - 2024-25 season) 04/26/2025 (Originally 9/1/2024)    TDAP/TD VACCINES (1 - Tdap) 08/25/2025 (Originally 8/6/1982)    ZOSTER VACCINE (1 of 2) 11/12/2025 (Originally 8/6/2013)    BMI FOLLOWUP  08/26/2025    COLORECTAL CANCER SCREENING  01/27/2035    HEPATITIS C SCREENING  Completed    Pneumococcal Vaccine 0-64  Aged Out                                                                                                                                                  Risk Factors Identified During Encounter  Immunizations Discussed/Encouraged  Inactivity/Sedentary: Patient was advised to exercise at least 150 minutes a week per CDC recommendations.  Dental Screening Recommended  Vision Screening Recommended    The above risks/problems have been discussed with the patient.  Pertinent information has been shared with the patient in the After Visit Summary.  An After Visit Summary and PPPS were made available to the patient.    Follow Up:  Next Medicare Wellness visit to be scheduled in 1 year.       Additional E&M Note during same encounter follows:  Patient has additional, significant, and separately identifiable condition(s)/problem(s) that require work above and beyond the Medicare Wellness Visit     Chief Complaint  Annual Exam, Abstract (BP has dropped low couple of times), and Medicare Wellness-Initial Visit    Subjective   HPI  Jason is also being seen  "today for an annual adult preventative physical exam.  and Jason is also being seen today for additional medical problem/s.  He has been fatigued more lately and wife is wondering about ordering vitamins to be added to his labs.   Blood pressure dropped to 80s over 60s a few days ago when he stood up, has been drinking more water and blood pressure has been stable since.     Objective   Vital Signs:  /80   Pulse 96   Temp 98.9 °F (37.2 °C)   Ht 172.7 cm (67.99\")   Wt 87.5 kg (193 lb)   SpO2 98%   BMI 29.35 kg/m²   Physical Exam  Vitals and nursing note reviewed.   Constitutional:       General: He is not in acute distress.     Appearance: Normal appearance.   HENT:      Head: Normocephalic and atraumatic.      Right Ear: Tympanic membrane, ear canal and external ear normal.      Left Ear: Tympanic membrane, ear canal and external ear normal.      Nose: Nose normal.      Mouth/Throat:      Mouth: Mucous membranes are moist.      Pharynx: Oropharynx is clear.   Eyes:      General: No scleral icterus.     Extraocular Movements: Extraocular movements intact.      Conjunctiva/sclera: Conjunctivae normal.      Pupils: Pupils are equal, round, and reactive to light.   Neck:      Thyroid: No thyromegaly.      Comments: Scar on right side of neck due to carotid stenosis surgery  Cardiovascular:      Rate and Rhythm: Normal rate and regular rhythm.      Pulses:           Dorsalis pedis pulses are 2+ on the right side and 2+ on the left side.        Posterior tibial pulses are 2+ on the right side and 2+ on the left side.      Heart sounds: Normal heart sounds.   Pulmonary:      Effort: Pulmonary effort is normal.      Breath sounds: Normal breath sounds.   Abdominal:      General: Abdomen is flat. Bowel sounds are normal. There is no distension.      Palpations: Abdomen is soft. There is no mass.      Tenderness: There is no abdominal tenderness. There is no guarding or rebound.      Hernia: No hernia is present. "   Musculoskeletal:         General: Normal range of motion.      Cervical back: Normal range of motion and neck supple. No tenderness.   Lymphadenopathy:      Cervical: No cervical adenopathy.   Skin:     General: Skin is warm and dry.      Findings: No rash.   Neurological:      General: No focal deficit present.      Mental Status: He is alert and oriented to person, place, and time. Mental status is at baseline.      Cranial Nerves: No cranial nerve deficit.      Sensory: No sensory deficit.      Motor: No weakness.      Coordination: Coordination normal.      Gait: Gait normal.   Psychiatric:         Mood and Affect: Mood normal.         Behavior: Behavior normal.         Thought Content: Thought content normal.         Judgment: Judgment normal.           Assessment and Plan   Diagnoses and all orders for this visit:    1. Annual physical exam (Primary)  -     CBC (No Diff)  -     Comprehensive Metabolic Panel  -     Lipid Panel  -     TSH Rfx On Abnormal To Free T4  -     Hemoglobin A1c  -     PSA SCREENING    2. Medicare annual wellness visit, subsequent    3. Primary hypertension  -     CBC (No Diff)  -     Comprehensive Metabolic Panel  -     Lipid Panel  -     TSH Rfx On Abnormal To Free T4    4. Hyperlipidemia, unspecified hyperlipidemia type  -     Lipid Panel    5. BPH associated with nocturia  -     PSA SCREENING    6. Abdominal aortic aneurysm (AAA) without rupture, unspecified part    7. Gastroesophageal reflux disease without esophagitis    8. Screening for endocrine, metabolic and immunity disorder  -     Comprehensive Metabolic Panel  -     TSH Rfx On Abnormal To Free T4  -     Hemoglobin A1c    9. Screening PSA (prostate specific antigen)  -     PSA SCREENING    10. Vitamin D deficiency  -     Vitamin D 25 hydroxy    11. Fatigue, unspecified type  -     CBC (No Diff)  -     Comprehensive Metabolic Panel  -     Lipid Panel  -     TSH Rfx On Abnormal To Free T4  -     Hemoglobin A1c  -     PSA  SCREENING  -     Vitamin D 25 hydroxy  -     Vitamin B12  -     Folate       Plan:  Hypertension stable.  Continue medications as prescribed, DASH diet, 150 minutes of cardiovascular exercise per week.    Hyperlipidemia-update lipid panel, work on low-cholesterol diet and get regular exercise, continue statin at bedtime  BPH-update PSA screening, continue Flomax, patient will let me know if continues to have increased urgency and frequency and can refer to urology for further evaluation  AAA-patient is awaiting surgery for repair this month  GERD-stable on Nexium as needed, follow reflux measures/diet  Fatigue-likely multifactorial, ensure getting plenty of hydration, 7 to 9 hours of sleep per night, 50 minutes of sunlight daily, update labs with vitamins to check for deficiencies  Continue following up with orthopedics for neck, wrist and foot pain    HCM:  Health Maintenance screenings and vaccinations updated, encouraged   Colon cancer screening every 10 years  Encouraged 150 minutes of exercise total per week for heart health   Recommend balanced diet, portion control, limiting excess carbs and sweets, limit caffeine   Dental visits twice per year, yearly eye exams, yearly dermatology exam      Follow Up   Return in about 6 months (around 8/7/2025) for HTN.  Patient was given instructions and counseling regarding his condition or for health maintenance advice. Please see specific information pulled into the AVS if appropriate.

## 2025-02-08 LAB
25(OH)D3+25(OH)D2 SERPL-MCNC: 47.5 NG/ML (ref 30–100)
ALBUMIN SERPL-MCNC: 4.5 G/DL (ref 3.9–4.9)
ALP SERPL-CCNC: 90 IU/L (ref 44–121)
ALT SERPL-CCNC: 19 IU/L (ref 0–44)
AST SERPL-CCNC: 13 IU/L (ref 0–40)
BILIRUB SERPL-MCNC: 0.6 MG/DL (ref 0–1.2)
BUN SERPL-MCNC: 25 MG/DL (ref 8–27)
BUN/CREAT SERPL: 20 (ref 10–24)
CALCIUM SERPL-MCNC: 9.6 MG/DL (ref 8.6–10.2)
CHLORIDE SERPL-SCNC: 98 MMOL/L (ref 96–106)
CHOLEST SERPL-MCNC: 196 MG/DL (ref 100–199)
CO2 SERPL-SCNC: 23 MMOL/L (ref 20–29)
CREAT SERPL-MCNC: 1.28 MG/DL (ref 0.76–1.27)
EGFRCR SERPLBLD CKD-EPI 2021: 64 ML/MIN/1.73
ERYTHROCYTE [DISTWIDTH] IN BLOOD BY AUTOMATED COUNT: 12.9 % (ref 11.6–15.4)
FOLATE SERPL-MCNC: 6.4 NG/ML
GLOBULIN SER CALC-MCNC: 2.8 G/DL (ref 1.5–4.5)
GLUCOSE SERPL-MCNC: 93 MG/DL (ref 70–99)
HBA1C MFR BLD: 5.6 % (ref 4.8–5.6)
HCT VFR BLD AUTO: 55.4 % (ref 37.5–51)
HDLC SERPL-MCNC: 59 MG/DL
HGB BLD-MCNC: 18.3 G/DL (ref 13–17.7)
LDLC SERPL CALC-MCNC: 126 MG/DL (ref 0–99)
MCH RBC QN AUTO: 30.1 PG (ref 26.6–33)
MCHC RBC AUTO-ENTMCNC: 33 G/DL (ref 31.5–35.7)
MCV RBC AUTO: 91 FL (ref 79–97)
PLATELET # BLD AUTO: 205 X10E3/UL (ref 150–450)
POTASSIUM SERPL-SCNC: 4.9 MMOL/L (ref 3.5–5.2)
PROT SERPL-MCNC: 7.3 G/DL (ref 6–8.5)
PSA SERPL-MCNC: 1.9 NG/ML (ref 0–4)
RBC # BLD AUTO: 6.08 X10E6/UL (ref 4.14–5.8)
SODIUM SERPL-SCNC: 136 MMOL/L (ref 134–144)
TRIGL SERPL-MCNC: 62 MG/DL (ref 0–149)
TSH SERPL DL<=0.005 MIU/L-ACNC: 1.66 UIU/ML (ref 0.45–4.5)
VIT B12 SERPL-MCNC: 364 PG/ML (ref 232–1245)
VLDLC SERPL CALC-MCNC: 11 MG/DL (ref 5–40)
WBC # BLD AUTO: 6.1 X10E3/UL (ref 3.4–10.8)

## 2025-02-17 RX ORDER — ROSUVASTATIN CALCIUM 40 MG/1
40 TABLET, COATED ORAL NIGHTLY
Qty: 90 TABLET | Refills: 3 | Status: SHIPPED | OUTPATIENT
Start: 2025-02-17

## 2025-02-24 ENCOUNTER — TRANSCRIBE ORDERS (OUTPATIENT)
Dept: ADMINISTRATIVE | Facility: HOSPITAL | Age: 60
End: 2025-02-24
Payer: MEDICARE

## 2025-02-24 DIAGNOSIS — I71.43 INFRARENAL ABDOMINAL AORTIC ANEURYSM (AAA) WITHOUT RUPTURE: Primary | ICD-10-CM

## 2025-03-03 ENCOUNTER — OFFICE VISIT (OUTPATIENT)
Dept: INTERNAL MEDICINE | Facility: CLINIC | Age: 60
End: 2025-03-03
Payer: MEDICARE

## 2025-03-03 VITALS
DIASTOLIC BLOOD PRESSURE: 68 MMHG | TEMPERATURE: 98.7 F | SYSTOLIC BLOOD PRESSURE: 102 MMHG | OXYGEN SATURATION: 98 % | HEART RATE: 92 BPM

## 2025-03-03 DIAGNOSIS — E78.00 HYPERCHOLESTEREMIA: ICD-10-CM

## 2025-03-03 DIAGNOSIS — Z86.79 STATUS POST ENDOVASCULAR ANEURYSM REPAIR (EVAR): Primary | ICD-10-CM

## 2025-03-03 DIAGNOSIS — K76.0 FATTY LIVER: ICD-10-CM

## 2025-03-03 DIAGNOSIS — Z51.81 MEDICATION MONITORING ENCOUNTER: ICD-10-CM

## 2025-03-03 DIAGNOSIS — I10 PRIMARY HYPERTENSION: ICD-10-CM

## 2025-03-03 DIAGNOSIS — Z98.890 STATUS POST ENDOVASCULAR ANEURYSM REPAIR (EVAR): Primary | ICD-10-CM

## 2025-03-03 PROCEDURE — 3078F DIAST BP <80 MM HG: CPT | Performed by: NURSE PRACTITIONER

## 2025-03-03 PROCEDURE — 3074F SYST BP LT 130 MM HG: CPT | Performed by: NURSE PRACTITIONER

## 2025-03-03 PROCEDURE — 1126F AMNT PAIN NOTED NONE PRSNT: CPT | Performed by: NURSE PRACTITIONER

## 2025-03-03 PROCEDURE — 99215 OFFICE O/P EST HI 40 MIN: CPT | Performed by: NURSE PRACTITIONER

## 2025-03-03 RX ORDER — LISINOPRIL 10 MG/1
TABLET ORAL
Start: 2025-03-03

## 2025-03-03 NOTE — PROGRESS NOTES
Office Visit      Date: 2025   Patient Name: Jason Hsu  : 1965   MRN: [id number]     Chief Complaint:    Chief Complaint   Patient presents with    Hospital Follow Up Visit     Pt was admitted to [facility] on  for infrarenal abdominal aortic aneurysm (AAA) without rupture. He was admitted under the services of Dr. Watson with Vascular Surgery.    Follow-up     Went to [facility] ER on   c/o left lower quadrant pain 1 week status post 5 cm infrarenal endovascular aortic aneurysm repair. Family check blood pressure at home and had a blood pressure 76/42.     Abstract     Would like to discuss recent CT results     History of Present Illness  The patient is a 59-year-old male presenting for a hospital follow-up. Wife is present.   He was admitted to [facility] on 2025 for an infrarenal abdominal aortic aneurysm repair. He is status post endovascular aneurysm repair, tolerating well with no signs of bleeding or infection. He reports no chest pain or shortness of breath. He went to the ER on 2025 due to left lower quadrant pain and hypotension. His blood pressure normalized, and a CT scan was normal except for mild fatty liver disease and diverticulosis. He has a follow-up with vascular surgery on 03/10/2025. Otherwise, he is doing well. Dose of crestor was increased to 40 mg and needs repeat labs in 6 weeks.     Subjective      Review of Systems:   Pertinent ROS noted in HPI.     I have reviewed the patients family history, social history, past medical history, past surgical history and have updated it as appropriate.     Medications:     Current Outpatient Medications:     lisinopril (PRINIVIL,ZESTRIL) 10 MG tablet, Take 1 tablet po daily, Disp: , Rfl:     aspirin 81 MG oral suspension, , Disp: , Rfl:     esomeprazole (nexIUM) 20 MG capsule, , Disp: , Rfl:     levocetirizine (XYZAL) 5 MG tablet, , Disp: , Rfl:     Magnesium 125 MG capsule, , Disp: , Rfl:     multivitamin (MULTI  VITAMIN PO), Take  by mouth Daily., Disp: , Rfl:     rosuvastatin (CRESTOR) 40 MG tablet, Take 1 tablet by mouth Every Night., Disp: 90 tablet, Rfl: 3    tamsulosin (FLOMAX) 0.4 MG capsule 24 hr capsule, Take 1 capsule by mouth Daily., Disp: 90 capsule, Rfl: 1    Allergies:   No Known Allergies    Objective     Physical Exam  Vitals and nursing note reviewed.   Constitutional:       Appearance: Normal appearance. He is overweight.   HENT:      Head: Normocephalic and atraumatic.      Right Ear: External ear normal.      Left Ear: External ear normal.      Nose: Nose normal.      Mouth/Throat:      Mouth: Mucous membranes are moist.   Eyes:      General: No scleral icterus.     Extraocular Movements: Extraocular movements intact.      Pupils: Pupils are equal, round, and reactive to light.   Cardiovascular:      Rate and Rhythm: Normal rate and regular rhythm.      Heart sounds: Normal heart sounds.   Pulmonary:      Effort: Pulmonary effort is normal.      Breath sounds: Normal breath sounds.   Abdominal:      General: Bowel sounds are normal.      Palpations: Abdomen is soft.      Tenderness: There is no abdominal tenderness. There is no guarding or rebound.   Musculoskeletal:         General: Normal range of motion.      Cervical back: Normal range of motion and neck supple.      Right lower leg: No edema.      Left lower leg: No edema.   Skin:     General: Skin is warm and dry.      Coloration: Skin is not jaundiced.   Neurological:      General: No focal deficit present.      Mental Status: He is alert and oriented to person, place, and time.   Psychiatric:         Mood and Affect: Mood normal.         Behavior: Behavior normal.         Thought Content: Thought content normal.         Judgment: Judgment normal.         Vital Signs:   Vitals:    03/03/25 1507   BP: 102/68   Pulse: 92   Temp: 98.7 °F (37.1 °C)   SpO2: 98%     There is no height or weight on file to calculate BMI.      ER Labs:   LIPASE (02/26/2025  20:41)  APTT (02/26/2025 20:41)  PROTIME-INR (02/26/2025 20:41)  COMPREHENSIVE METABOLIC PANEL (02/26/2025 20:41)  CBC with Auto Diff (02/26/2025 20:41)     ER Imaging:  CT Abdomen Pelvis With Contrast  Result Date: 2/26/2025  No acute aortic or arterial pathology within the abdomen or pelvis. Status post repair of an infrarenal abdominal aortic aneurysm without evidence of acute complication. Other ancillary findings as above. Images personally reviewed, interpreted and dictated by SONG Mariano M.D.    Assessment / Plan      Assessment/Plan:   Diagnoses and all orders for this visit:    1. Status post endovascular aneurysm repair (EVAR) (Primary)    2. Primary hypertension  -     lisinopril (PRINIVIL,ZESTRIL) 10 MG tablet; Take 1 tablet po daily  -     Ambulatory Referral to Cardiology    3. Hypercholesteremia  -     Lipid Panel; Future    4. Fatty liver  -     Hepatic Function Panel; Future    5. Medication monitoring encounter  -     Lipid Panel; Future  -     Hepatic Function Panel; Future       Assessment & Plan  Doing well without signs of infection.  He has had 2 episodes of hypotension, decrease lisinopril to 10 mg daily.  Monitor blood pressure twice a day.  Goal is less than 130/80.  His wife reports that his pulse has been higher than usual, in the 90s, compared to his typical resting rate of 50-60.  Requesting a cardiac evaluation, it was also recommended by his cardiothoracic surgeon to see a cardiologist regularly.  Referral placed. Advised to stay hydrated with more than 64 fluid ounces of water daily, change positions slowly. Follow up with vascular surgery on 03/10/2025.  Discussion regarding mild fatty liver disease noted on CT imaging.  Recommend 5 to 10% weight loss, 3 to 4 cups of black coffee a day, vitamin E supplement, low-fat diet, avoid alcohol.  Recheck hepatic function and lipid panel in 6 weeks after dose increase of Crestor.      Follow Up:   Next scheduled appointment, sooner prn        Priya PAGE  [facility]    SINTIA spent 45 minutes caring for Jason Hsu on this date of service. This time includes time spent by me in the following activities: preparing for the visit, reviewing labs, imaging, surgical notes and ER notes, performing a medically appropriate examination and/or evaluation, counseling and educating the patient/family/caregiver, ordering medications, tests, or procedures and documenting information in the medical record.

## 2025-03-05 ENCOUNTER — TELEPHONE (OUTPATIENT)
Dept: INTERNAL MEDICINE | Facility: CLINIC | Age: 60
End: 2025-03-05
Payer: MEDICARE

## 2025-03-05 NOTE — TELEPHONE ENCOUNTER
Caller: HENRIQUE WITH [facility] DENTAL DR MARQUEZ SCALES    Relationship:     Best call back number: [phone]     Who are you requesting to speak with (clinical staff, provider,  specific staff member): CLINICAL    What was the call regarding: DOES HE REQUIRE PRE MEDICATION FOR DEEP DENTAL CLEANING OF THE ROOTS OF HIS TEETH, UNDER LOCAL ANESTHESIA    Is it okay if the provider responds through MyChart:

## 2025-03-06 NOTE — TELEPHONE ENCOUNTER
I would recommend double checking with his vascular surgeon.   Please provide dentist office with his information     Jacinto Watson  [place] [address]   Phone: tel:[phone]   fax:[phone]

## 2025-03-12 ENCOUNTER — OFFICE VISIT (OUTPATIENT)
Dept: CARDIOLOGY | Facility: CLINIC | Age: 60
End: 2025-03-12
Payer: MEDICARE

## 2025-03-12 VITALS
WEIGHT: 180.8 LBS | HEIGHT: 67 IN | OXYGEN SATURATION: 96 % | DIASTOLIC BLOOD PRESSURE: 56 MMHG | HEART RATE: 91 BPM | SYSTOLIC BLOOD PRESSURE: 88 MMHG | BODY MASS INDEX: 28.38 KG/M2

## 2025-03-12 DIAGNOSIS — I10 PRIMARY HYPERTENSION: ICD-10-CM

## 2025-03-12 DIAGNOSIS — I25.10 CORONARY ARTERY DISEASE INVOLVING NATIVE CORONARY ARTERY OF NATIVE HEART WITHOUT ANGINA PECTORIS: Primary | ICD-10-CM

## 2025-03-12 DIAGNOSIS — I65.23 BILATERAL CAROTID ARTERY STENOSIS: ICD-10-CM

## 2025-03-12 DIAGNOSIS — E78.00 PURE HYPERCHOLESTEROLEMIA: ICD-10-CM

## 2025-03-12 DIAGNOSIS — I71.40 ABDOMINAL AORTIC ANEURYSM (AAA) WITHOUT RUPTURE, UNSPECIFIED PART: ICD-10-CM

## 2025-03-12 NOTE — PROGRESS NOTES
"     [facility] Cardiology OP Consult Note    Jason Hsu  [id number]  2025    Referred By: Priya Durand APRN    Chief Complaint: Reestablish cardiac care    History of Present Illness:   Mr. Jason Hsu is a 59 y.o. male who presents to the Cardiology Clinic to reestablish cardiac care.  The patient has a past medical history significant for hypertension and a AAA now status post recent EVAR.  He also has a history of bilateral carotid stenosis, status post right carotid endarterectomy.  He has a cardiac history significant for moderate coronary artery disease based on a coronary angiogram in .  Today, the patient reports he has been recovering from his EVAR well.  Following EVAR, he has has had difficulty with episodes of hypotension.  His systolic BP will be as low as the 80s, which is occasionally associated with dizziness and lightheadedness.  He has not had any syncopal episodes.  His lisinopril has been down titrated to 10 mg recently, which appears to have improved his episodes of dizziness.  He does have occasional episodes of chest discomfort, which is typically resolved with \"popping his ribs\" back into place.  He does not appear to have any significant exertional anginal symptoms.  He denies exertional dyspnea.  No other specific complaints today.    Past Cardiac Testin. Last Coronary Angio:    1.  Moderate proximal LAD disease, FFR 0.82   2.  Moderate LCx disease, FFR 0.98  2. Prior Stress Testing: MPS    1.  No evidence of inducible ischemia  3. Last Echo: None  4. Prior Holter Monitor: None    Review of Systems:   Review of Systems   Constitutional:  Negative for activity change, appetite change, chills, diaphoresis, fatigue, fever, unexpected weight gain and unexpected weight loss.   Eyes:  Negative for blurred vision and double vision.   Respiratory:  Negative for cough, chest tightness, shortness of breath and wheezing.    Cardiovascular:  Positive for " chest pain. Negative for palpitations and leg swelling.   Gastrointestinal:  Negative for abdominal pain, anal bleeding, blood in stool and GERD.   Endocrine: Negative for cold intolerance and heat intolerance.   Genitourinary:  Negative for hematuria.   Neurological:  Positive for dizziness and light-headedness. Negative for syncope and weakness.   Hematological:  Does not bruise/bleed easily.   Psychiatric/Behavioral:  Negative for depressed mood and stress. The patient is not nervous/anxious.        Past Medical History:   Past Medical History:   Diagnosis Date    Arthritis 2016    Benign prostatic hyperplasia 01/22    Diverticulosis 1/27/2025    Surgeon said not a big deal currently    Erectile dysfunction 2022    Frozen shoulder     bilateral    Hypertension 2018       Past Surgical History:   Past Surgical History:   Procedure Laterality Date    CAROTID STENT Right 2020    COLONOSCOPY  2018?    FRACTURE SURGERY  1989?    Left shoulder    KNEE SURGERY      othroscopic    SHOULDER SURGERY      SPINE SURGERY  7/2017       Family History:   Family History   Problem Relation Age of Onset    Diabetes Mother     Kidney disease Mother     Cancer Sister        Social History:   Social History     Socioeconomic History    Marital status:    Tobacco Use    Smoking status: Never     Passive exposure: Never    Smokeless tobacco: Never   Vaping Use    Vaping status: Never Used   Substance and Sexual Activity    Alcohol use: Not Currently    Drug use: Never    Sexual activity: Yes     Partners: Female     Birth control/protection: Vasectomy       Medications:     Current Outpatient Medications:     aspirin 81 MG oral suspension, , Disp: , Rfl:     esomeprazole (nexIUM) 20 MG capsule, , Disp: , Rfl:     levocetirizine (XYZAL) 5 MG tablet, , Disp: , Rfl:     lisinopril (PRINIVIL,ZESTRIL) 10 MG tablet, Take 1 tablet po daily, Disp: , Rfl:     Magnesium 125 MG capsule, , Disp: , Rfl:     multivitamin (MULTI VITAMIN PO),  "Take  by mouth Daily., Disp: , Rfl:     rosuvastatin (CRESTOR) 40 MG tablet, Take 1 tablet by mouth Every Night., Disp: 90 tablet, Rfl: 3    tamsulosin (FLOMAX) 0.4 MG capsule 24 hr capsule, Take 1 capsule by mouth Daily., Disp: 90 capsule, Rfl: 1    Allergies:   No Known Allergies    Physical Exam:  Vital Signs:   Vitals:    03/12/25 1407 03/12/25 1413   BP: 116/76 (!) 88/56   BP Location: Left arm Left arm   Patient Position: Sitting Standing   Cuff Size: Adult Adult   Pulse: 76 91   SpO2: 96%    Weight: 82 kg (180 lb 12.8 oz)    Height: 170.2 cm (67\")        Physical Exam  Constitutional:       General: He is not in acute distress.     Appearance: Normal appearance. He is not diaphoretic.   HENT:      Head: Normocephalic and atraumatic.   Cardiovascular:      Rate and Rhythm: Normal rate and regular rhythm.      Heart sounds: No murmur heard.  Pulmonary:      Effort: Pulmonary effort is normal. No respiratory distress.      Breath sounds: Normal breath sounds. No stridor. No wheezing, rhonchi or rales.   Abdominal:      General: Bowel sounds are normal. There is no distension.      Palpations: Abdomen is soft.      Tenderness: There is no abdominal tenderness. There is no guarding or rebound.   Musculoskeletal:         General: No swelling. Normal range of motion.      Cervical back: Neck supple. No tenderness.   Skin:     General: Skin is warm and dry.   Neurological:      General: No focal deficit present.      Mental Status: He is alert and oriented to person, place, and time.   Psychiatric:         Mood and Affect: Mood normal.         Behavior: Behavior normal.         Results Review:   I reviewed the patient's new clinical results.      ECG 12 Lead    Date/Time: 3/12/2025 2:49 PM  Performed by: Bryant Burciaga MD    Authorized by: Bryant Burciaga MD  Comparison: not compared with previous ECG   Previous ECG: no previous ECG available  Rhythm: sinus rhythm  Rate: normal  QRS axis: normal    Clinical " impression: normal ECG          Assessment / Plan:     1. Coronary artery disease  -- History of moderate coronary artery disease involving the LCx and LAD based on coronary angiogram in 2013  --No current chest pain or anginal symptoms  --ECG without acute or prior ischemic changes  --Continue aspirin 81 mg daily  --Continue high intensity statin  --Follow-up in 6 months, sooner if required    2. Primary hypertension  -- Recent difficulty with hypotension, agree with down titration of lisinopril  --Continue to down titrate lisinopril if recurrent dizziness/lightheadedness or orthostatic symptoms    3. Pure hypercholesterolemia  -- Continue high intensity statin    4. Abdominal aortic aneurysm (AAA)   -- Status post EVAR    5. Bilateral carotid artery stenosis  -- Managed by vascular surgery      Follow Up:   Return in about 6 months (around 9/12/2025).      Thank you for allowing me to participate in the care of your patient. Please to not hesitate to contact me with additional questions or concerns.     JAZIEL Burciaga MD  Interventional Cardiology   03/12/2025  14:07 EDT

## 2025-04-25 ENCOUNTER — EXTERNAL PBMM DATA (OUTPATIENT)
Dept: PHARMACY | Facility: OTHER | Age: 60
End: 2025-04-25
Payer: MEDICARE

## 2025-06-25 DIAGNOSIS — R35.1 BPH ASSOCIATED WITH NOCTURIA: ICD-10-CM

## 2025-06-25 DIAGNOSIS — N40.1 BPH ASSOCIATED WITH NOCTURIA: ICD-10-CM

## 2025-06-25 RX ORDER — TAMSULOSIN HYDROCHLORIDE 0.4 MG/1
1 CAPSULE ORAL DAILY
Qty: 90 CAPSULE | Refills: 1 | Status: SHIPPED | OUTPATIENT
Start: 2025-06-25 | End: 2025-06-27 | Stop reason: SDUPTHER

## 2025-06-27 DIAGNOSIS — R35.1 BPH ASSOCIATED WITH NOCTURIA: ICD-10-CM

## 2025-06-27 DIAGNOSIS — N40.1 BPH ASSOCIATED WITH NOCTURIA: ICD-10-CM

## 2025-06-27 RX ORDER — TAMSULOSIN HYDROCHLORIDE 0.4 MG/1
1 CAPSULE ORAL DAILY
Qty: 90 CAPSULE | Refills: 1 | Status: SHIPPED | OUTPATIENT
Start: 2025-06-27

## 2025-07-24 ENCOUNTER — EXTERNAL PBMM DATA (OUTPATIENT)
Dept: PHARMACY | Facility: OTHER | Age: 60
End: 2025-07-24
Payer: MEDICARE

## 2025-08-07 ENCOUNTER — OFFICE VISIT (OUTPATIENT)
Dept: INTERNAL MEDICINE | Facility: CLINIC | Age: 60
End: 2025-08-07
Payer: MEDICARE

## 2025-08-07 VITALS
BODY MASS INDEX: 28.25 KG/M2 | HEIGHT: 67 IN | WEIGHT: 180 LBS | TEMPERATURE: 97.3 F | SYSTOLIC BLOOD PRESSURE: 112 MMHG | DIASTOLIC BLOOD PRESSURE: 62 MMHG | OXYGEN SATURATION: 97 % | HEART RATE: 66 BPM

## 2025-08-07 DIAGNOSIS — K76.0 FATTY LIVER: ICD-10-CM

## 2025-08-07 DIAGNOSIS — I10 PRIMARY HYPERTENSION: Primary | ICD-10-CM

## 2025-08-07 DIAGNOSIS — E78.2 MIXED HYPERLIPIDEMIA: ICD-10-CM

## 2025-08-07 DIAGNOSIS — N40.1 BPH ASSOCIATED WITH NOCTURIA: ICD-10-CM

## 2025-08-07 DIAGNOSIS — R35.1 BPH ASSOCIATED WITH NOCTURIA: ICD-10-CM

## 2025-08-07 RX ORDER — LISINOPRIL 20 MG/1
20 TABLET ORAL
COMMUNITY
Start: 2025-07-28 | End: 2025-08-07 | Stop reason: SDUPTHER

## 2025-08-07 RX ORDER — ROSUVASTATIN CALCIUM 20 MG/1
1 TABLET, COATED ORAL DAILY
COMMUNITY
Start: 2025-05-10

## 2025-08-07 RX ORDER — LISINOPRIL 20 MG/1
20 TABLET ORAL DAILY
Qty: 90 TABLET | Refills: 3 | Status: SHIPPED | OUTPATIENT
Start: 2025-08-07

## 2025-08-21 ENCOUNTER — EXTERNAL PBMM DATA (OUTPATIENT)
Dept: PHARMACY | Facility: OTHER | Age: 60
End: 2025-08-21
Payer: MEDICARE